# Patient Record
Sex: MALE | Race: WHITE | Employment: UNEMPLOYED | URBAN - METROPOLITAN AREA
[De-identification: names, ages, dates, MRNs, and addresses within clinical notes are randomized per-mention and may not be internally consistent; named-entity substitution may affect disease eponyms.]

---

## 2024-05-13 ENCOUNTER — HOSPITAL ENCOUNTER (INPATIENT)
Age: 64
LOS: 3 days | Discharge: OTHER INSTITUTION | End: 2024-05-16
Attending: EMERGENCY MEDICINE | Admitting: HOSPITALIST
Payer: COMMERCIAL

## 2024-05-13 ENCOUNTER — APPOINTMENT (OUTPATIENT)
Dept: CT IMAGING | Age: 64
End: 2024-05-13
Payer: COMMERCIAL

## 2024-05-13 ENCOUNTER — APPOINTMENT (OUTPATIENT)
Dept: GENERAL RADIOLOGY | Age: 64
End: 2024-05-13
Payer: COMMERCIAL

## 2024-05-13 DIAGNOSIS — R09.02 HYPOXEMIA: ICD-10-CM

## 2024-05-13 DIAGNOSIS — J18.9 PNEUMONIA OF BOTH LUNGS DUE TO INFECTIOUS ORGANISM, UNSPECIFIED PART OF LUNG: Primary | ICD-10-CM

## 2024-05-13 DIAGNOSIS — R40.0 SOMNOLENCE: ICD-10-CM

## 2024-05-13 LAB
ALBUMIN SERPL-MCNC: 3.9 G/DL (ref 3.5–5.2)
ALBUMIN/GLOB SERPL: 1 {RATIO} (ref 1–2.5)
ALP SERPL-CCNC: 101 U/L (ref 40–129)
ALT SERPL-CCNC: 21 U/L (ref 5–41)
ANION GAP SERPL CALCULATED.3IONS-SCNC: 10 MMOL/L (ref 9–17)
AST SERPL-CCNC: 21 U/L
BASOPHILS # BLD: 0 K/UL (ref 0–0.2)
BASOPHILS NFR BLD: 1 % (ref 0–2)
BILIRUB SERPL-MCNC: 0.3 MG/DL (ref 0.3–1.2)
BILIRUB UR QL STRIP: NEGATIVE
BNP SERPL-MCNC: 62 PG/ML
BUN SERPL-MCNC: 17 MG/DL (ref 8–23)
CALCIUM SERPL-MCNC: 9.5 MG/DL (ref 8.6–10.4)
CHLORIDE SERPL-SCNC: 108 MMOL/L (ref 98–107)
CLARITY UR: CLEAR
CO2 SERPL-SCNC: 27 MMOL/L (ref 20–31)
COLOR UR: YELLOW
COMMENT: NORMAL
CREAT SERPL-MCNC: 1.1 MG/DL (ref 0.7–1.2)
EOSINOPHIL # BLD: 0.1 K/UL (ref 0–0.4)
EOSINOPHILS RELATIVE PERCENT: 2 % (ref 1–4)
ERYTHROCYTE [DISTWIDTH] IN BLOOD BY AUTOMATED COUNT: 15.5 % (ref 12.5–15.4)
FIO2: 30
FLUAV AG SPEC QL: NEGATIVE
FLUBV AG SPEC QL: NEGATIVE
GFR, ESTIMATED: 75 ML/MIN/1.73M2
GLUCOSE SERPL-MCNC: 97 MG/DL (ref 70–99)
GLUCOSE UR STRIP-MCNC: NEGATIVE MG/DL
HCO3 VENOUS: 24.1 MMOL/L (ref 22–29)
HCT VFR BLD AUTO: 45.2 % (ref 41–53)
HGB BLD-MCNC: 14.7 G/DL (ref 13.5–17.5)
HGB UR QL STRIP.AUTO: NEGATIVE
KETONES UR STRIP-MCNC: NEGATIVE MG/DL
LACTATE BLDV-SCNC: 1.3 MMOL/L (ref 0.5–1.9)
LEUKOCYTE ESTERASE UR QL STRIP: NEGATIVE
LYMPHOCYTES NFR BLD: 2.6 K/UL (ref 1–4.8)
LYMPHOCYTES RELATIVE PERCENT: 39 % (ref 24–44)
MCH RBC QN AUTO: 27.8 PG (ref 26–34)
MCHC RBC AUTO-ENTMCNC: 32.5 G/DL (ref 31–37)
MCV RBC AUTO: 85.6 FL (ref 80–100)
MONOCYTES NFR BLD: 0.4 K/UL (ref 0.1–1.2)
MONOCYTES NFR BLD: 7 % (ref 2–11)
NEGATIVE BASE EXCESS, VEN: 4.1 MMOL/L (ref 0–2)
NEUTROPHILS NFR BLD: 51 % (ref 36–66)
NEUTS SEG NFR BLD: 3.3 K/UL (ref 1.8–7.7)
NITRITE UR QL STRIP: NEGATIVE
O2 SAT, VEN: 36.6 % (ref 60–85)
PCO2, VEN: 57.3 MM HG (ref 41–51)
PH UR STRIP: 6 [PH] (ref 5–8)
PH VENOUS: 7.23 (ref 7.32–7.43)
PLATELET # BLD AUTO: 157 K/UL (ref 140–450)
PMV BLD AUTO: 7.5 FL (ref 6–12)
PO2, VEN: 26.1 MM HG (ref 30–50)
POC LACTIC ACID: 0.8 MMOL/L (ref 0.56–1.39)
POTASSIUM SERPL-SCNC: 4.2 MMOL/L (ref 3.7–5.3)
PROT SERPL-MCNC: 7.9 G/DL (ref 6.4–8.3)
PROT UR STRIP-MCNC: NEGATIVE MG/DL
RBC # BLD AUTO: 5.28 M/UL (ref 4.5–5.9)
SARS-COV-2 RDRP RESP QL NAA+PROBE: NOT DETECTED
SODIUM SERPL-SCNC: 145 MMOL/L (ref 135–144)
SP GR UR STRIP: 1.02 (ref 1–1.03)
SPECIMEN DESCRIPTION: NORMAL
TROPONIN I SERPL HS-MCNC: 23 NG/L (ref 0–22)
TROPONIN I SERPL HS-MCNC: 28 NG/L (ref 0–22)
UROBILINOGEN UR STRIP-ACNC: NORMAL EU/DL (ref 0–1)
WBC OTHER # BLD: 6.5 K/UL (ref 3.5–11)

## 2024-05-13 PROCEDURE — 71045 X-RAY EXAM CHEST 1 VIEW: CPT

## 2024-05-13 PROCEDURE — 87635 SARS-COV-2 COVID-19 AMP PRB: CPT

## 2024-05-13 PROCEDURE — 1200000000 HC SEMI PRIVATE

## 2024-05-13 PROCEDURE — 83880 ASSAY OF NATRIURETIC PEPTIDE: CPT

## 2024-05-13 PROCEDURE — 87804 INFLUENZA ASSAY W/OPTIC: CPT

## 2024-05-13 PROCEDURE — 99285 EMERGENCY DEPT VISIT HI MDM: CPT

## 2024-05-13 PROCEDURE — 83605 ASSAY OF LACTIC ACID: CPT

## 2024-05-13 PROCEDURE — 70450 CT HEAD/BRAIN W/O DYE: CPT

## 2024-05-13 PROCEDURE — 82803 BLOOD GASES ANY COMBINATION: CPT

## 2024-05-13 PROCEDURE — 2580000003 HC RX 258: Performed by: EMERGENCY MEDICINE

## 2024-05-13 PROCEDURE — 80053 COMPREHEN METABOLIC PANEL: CPT

## 2024-05-13 PROCEDURE — 87040 BLOOD CULTURE FOR BACTERIA: CPT

## 2024-05-13 PROCEDURE — 84484 ASSAY OF TROPONIN QUANT: CPT

## 2024-05-13 PROCEDURE — 81003 URINALYSIS AUTO W/O SCOPE: CPT

## 2024-05-13 PROCEDURE — 36415 COLL VENOUS BLD VENIPUNCTURE: CPT

## 2024-05-13 PROCEDURE — 85025 COMPLETE CBC W/AUTO DIFF WBC: CPT

## 2024-05-13 PROCEDURE — 93005 ELECTROCARDIOGRAM TRACING: CPT | Performed by: EMERGENCY MEDICINE

## 2024-05-13 PROCEDURE — 6360000002 HC RX W HCPCS: Performed by: EMERGENCY MEDICINE

## 2024-05-13 RX ORDER — 0.9 % SODIUM CHLORIDE 0.9 %
1000 INTRAVENOUS SOLUTION INTRAVENOUS ONCE
Status: COMPLETED | OUTPATIENT
Start: 2024-05-13 | End: 2024-05-13

## 2024-05-13 RX ORDER — OLANZAPINE 2.5 MG/1
2.5 TABLET, FILM COATED ORAL 2 TIMES DAILY
COMMUNITY

## 2024-05-13 RX ORDER — FLUOXETINE 10 MG/1
10 TABLET, FILM COATED ORAL EVERY MORNING
COMMUNITY

## 2024-05-13 RX ORDER — KETOCONAZOLE 20 MG/G
CREAM TOPICAL EVERY 6 HOURS PRN
COMMUNITY

## 2024-05-13 RX ORDER — DOCUSATE SODIUM 100 MG/1
100 CAPSULE, LIQUID FILLED ORAL NIGHTLY
COMMUNITY

## 2024-05-13 RX ORDER — TOPIRAMATE 25 MG/1
25 TABLET ORAL 2 TIMES DAILY
COMMUNITY

## 2024-05-13 RX ORDER — LORATADINE 10 MG/1
10 TABLET ORAL DAILY
COMMUNITY

## 2024-05-13 RX ORDER — TAMSULOSIN HYDROCHLORIDE 0.4 MG/1
0.4 CAPSULE ORAL
COMMUNITY

## 2024-05-13 RX ORDER — GABAPENTIN 400 MG/1
400 CAPSULE ORAL 3 TIMES DAILY
COMMUNITY

## 2024-05-13 RX ORDER — ERGOCALCIFEROL 1.25 MG/1
50000 CAPSULE ORAL WEEKLY
COMMUNITY

## 2024-05-13 RX ORDER — ASPIRIN 81 MG/1
81 TABLET ORAL DAILY
COMMUNITY

## 2024-05-13 RX ORDER — DONEPEZIL HYDROCHLORIDE 5 MG/1
5 TABLET, FILM COATED ORAL NIGHTLY
COMMUNITY

## 2024-05-13 RX ORDER — NALOXONE HYDROCHLORIDE 4 MG/.1ML
1 SPRAY NASAL PRN
COMMUNITY

## 2024-05-13 RX ORDER — ATORVASTATIN CALCIUM 20 MG/1
20 TABLET, FILM COATED ORAL NIGHTLY
COMMUNITY

## 2024-05-13 RX ORDER — ACETAMINOPHEN 325 MG/1
650 TABLET ORAL EVERY 6 HOURS PRN
COMMUNITY

## 2024-05-13 RX ORDER — METHOCARBAMOL 500 MG/1
500 TABLET, FILM COATED ORAL EVERY 8 HOURS PRN
COMMUNITY

## 2024-05-13 RX ADMIN — AZITHROMYCIN MONOHYDRATE 500 MG: 500 INJECTION, POWDER, LYOPHILIZED, FOR SOLUTION INTRAVENOUS at 22:57

## 2024-05-13 RX ADMIN — CEFTRIAXONE SODIUM 1000 MG: 1 INJECTION, POWDER, FOR SOLUTION INTRAMUSCULAR; INTRAVENOUS at 22:49

## 2024-05-13 RX ADMIN — SODIUM CHLORIDE 1000 ML: 9 INJECTION, SOLUTION INTRAVENOUS at 21:30

## 2024-05-13 ASSESSMENT — PAIN - FUNCTIONAL ASSESSMENT: PAIN_FUNCTIONAL_ASSESSMENT: NONE - DENIES PAIN

## 2024-05-14 ENCOUNTER — APPOINTMENT (OUTPATIENT)
Dept: GENERAL RADIOLOGY | Age: 64
End: 2024-05-14
Payer: COMMERCIAL

## 2024-05-14 PROBLEM — R62.50 DEVELOPMENTAL DELAY: Status: ACTIVE | Noted: 2024-05-14

## 2024-05-14 PROBLEM — J96.01 ACUTE HYPOXIC RESPIRATORY FAILURE (HCC): Status: ACTIVE | Noted: 2024-05-14

## 2024-05-14 PROBLEM — Z98.890 H/O CERVICAL SPINE SURGERY: Status: ACTIVE | Noted: 2024-05-14

## 2024-05-14 LAB
ANION GAP SERPL CALCULATED.3IONS-SCNC: 7 MMOL/L (ref 9–17)
BASOPHILS # BLD: 0 K/UL (ref 0–0.2)
BASOPHILS NFR BLD: 1 % (ref 0–2)
BUN SERPL-MCNC: 16 MG/DL (ref 8–23)
CALCIUM SERPL-MCNC: 8.7 MG/DL (ref 8.6–10.4)
CHLORIDE SERPL-SCNC: 114 MMOL/L (ref 98–107)
CO2 SERPL-SCNC: 25 MMOL/L (ref 20–31)
CREAT SERPL-MCNC: 0.9 MG/DL (ref 0.7–1.2)
EOSINOPHIL # BLD: 0.2 K/UL (ref 0–0.4)
EOSINOPHILS RELATIVE PERCENT: 3 % (ref 1–4)
ERYTHROCYTE [DISTWIDTH] IN BLOOD BY AUTOMATED COUNT: 15.2 % (ref 12.5–15.4)
GFR, ESTIMATED: >90 ML/MIN/1.73M2
GLUCOSE SERPL-MCNC: 89 MG/DL (ref 70–99)
HCT VFR BLD AUTO: 42.2 % (ref 41–53)
HGB BLD-MCNC: 13.8 G/DL (ref 13.5–17.5)
INR PPP: 1.1
LACTATE BLDV-SCNC: 0.9 MMOL/L (ref 0.5–2.2)
LYMPHOCYTES NFR BLD: 2.2 K/UL (ref 1–4.8)
LYMPHOCYTES RELATIVE PERCENT: 35 % (ref 24–44)
MCH RBC QN AUTO: 27.9 PG (ref 26–34)
MCHC RBC AUTO-ENTMCNC: 32.6 G/DL (ref 31–37)
MCV RBC AUTO: 85.4 FL (ref 80–100)
MONOCYTES NFR BLD: 0.4 K/UL (ref 0.1–1.2)
MONOCYTES NFR BLD: 7 % (ref 2–11)
NEUTROPHILS NFR BLD: 54 % (ref 36–66)
NEUTS SEG NFR BLD: 3.4 K/UL (ref 1.8–7.7)
PLATELET # BLD AUTO: 126 K/UL (ref 140–450)
PMV BLD AUTO: 7.4 FL (ref 6–12)
POTASSIUM SERPL-SCNC: 3.8 MMOL/L (ref 3.7–5.3)
PROTHROMBIN TIME: 11.3 SEC (ref 9.4–12.6)
RBC # BLD AUTO: 4.95 M/UL (ref 4.5–5.9)
SODIUM SERPL-SCNC: 146 MMOL/L (ref 135–144)
WBC OTHER # BLD: 6.3 K/UL (ref 3.5–11)

## 2024-05-14 PROCEDURE — 80048 BASIC METABOLIC PNL TOTAL CA: CPT

## 2024-05-14 PROCEDURE — 94640 AIRWAY INHALATION TREATMENT: CPT

## 2024-05-14 PROCEDURE — 99222 1ST HOSP IP/OBS MODERATE 55: CPT | Performed by: STUDENT IN AN ORGANIZED HEALTH CARE EDUCATION/TRAINING PROGRAM

## 2024-05-14 PROCEDURE — 97116 GAIT TRAINING THERAPY: CPT

## 2024-05-14 PROCEDURE — 97166 OT EVAL MOD COMPLEX 45 MIN: CPT

## 2024-05-14 PROCEDURE — 6370000000 HC RX 637 (ALT 250 FOR IP): Performed by: NURSE PRACTITIONER

## 2024-05-14 PROCEDURE — 1200000000 HC SEMI PRIVATE

## 2024-05-14 PROCEDURE — 94760 N-INVAS EAR/PLS OXIMETRY 1: CPT

## 2024-05-14 PROCEDURE — 2580000003 HC RX 258: Performed by: NURSE PRACTITIONER

## 2024-05-14 PROCEDURE — 94761 N-INVAS EAR/PLS OXIMETRY MLT: CPT

## 2024-05-14 PROCEDURE — 36415 COLL VENOUS BLD VENIPUNCTURE: CPT

## 2024-05-14 PROCEDURE — 2700000000 HC OXYGEN THERAPY PER DAY

## 2024-05-14 PROCEDURE — 71045 X-RAY EXAM CHEST 1 VIEW: CPT

## 2024-05-14 PROCEDURE — 6360000002 HC RX W HCPCS: Performed by: NURSE PRACTITIONER

## 2024-05-14 PROCEDURE — 85025 COMPLETE CBC W/AUTO DIFF WBC: CPT

## 2024-05-14 PROCEDURE — 97530 THERAPEUTIC ACTIVITIES: CPT

## 2024-05-14 PROCEDURE — 85610 PROTHROMBIN TIME: CPT

## 2024-05-14 PROCEDURE — 97162 PT EVAL MOD COMPLEX 30 MIN: CPT

## 2024-05-14 PROCEDURE — 83605 ASSAY OF LACTIC ACID: CPT

## 2024-05-14 RX ORDER — SODIUM CHLORIDE 9 MG/ML
INJECTION, SOLUTION INTRAVENOUS PRN
Status: DISCONTINUED | OUTPATIENT
Start: 2024-05-14 | End: 2024-05-16 | Stop reason: HOSPADM

## 2024-05-14 RX ORDER — SODIUM CHLORIDE 0.9 % (FLUSH) 0.9 %
5-40 SYRINGE (ML) INJECTION EVERY 12 HOURS SCHEDULED
Status: DISCONTINUED | OUTPATIENT
Start: 2024-05-14 | End: 2024-05-16 | Stop reason: HOSPADM

## 2024-05-14 RX ORDER — SODIUM CHLORIDE 9 MG/ML
INJECTION, SOLUTION INTRAVENOUS CONTINUOUS
Status: DISCONTINUED | OUTPATIENT
Start: 2024-05-14 | End: 2024-05-16

## 2024-05-14 RX ORDER — ACETAMINOPHEN 650 MG/1
650 SUPPOSITORY RECTAL EVERY 6 HOURS PRN
Status: DISCONTINUED | OUTPATIENT
Start: 2024-05-14 | End: 2024-05-16 | Stop reason: HOSPADM

## 2024-05-14 RX ORDER — ACETAMINOPHEN 325 MG/1
650 TABLET ORAL EVERY 6 HOURS PRN
Status: DISCONTINUED | OUTPATIENT
Start: 2024-05-14 | End: 2024-05-16 | Stop reason: HOSPADM

## 2024-05-14 RX ORDER — ENOXAPARIN SODIUM 100 MG/ML
40 INJECTION SUBCUTANEOUS DAILY
Status: DISCONTINUED | OUTPATIENT
Start: 2024-05-14 | End: 2024-05-16 | Stop reason: HOSPADM

## 2024-05-14 RX ORDER — IPRATROPIUM BROMIDE AND ALBUTEROL SULFATE 2.5; .5 MG/3ML; MG/3ML
1 SOLUTION RESPIRATORY (INHALATION)
Status: DISCONTINUED | OUTPATIENT
Start: 2024-05-14 | End: 2024-05-14

## 2024-05-14 RX ORDER — ALBUTEROL SULFATE 2.5 MG/3ML
2.5 SOLUTION RESPIRATORY (INHALATION)
Status: DISCONTINUED | OUTPATIENT
Start: 2024-05-14 | End: 2024-05-16 | Stop reason: HOSPADM

## 2024-05-14 RX ORDER — ONDANSETRON 4 MG/1
4 TABLET, ORALLY DISINTEGRATING ORAL EVERY 8 HOURS PRN
Status: DISCONTINUED | OUTPATIENT
Start: 2024-05-14 | End: 2024-05-16 | Stop reason: HOSPADM

## 2024-05-14 RX ORDER — ONDANSETRON 2 MG/ML
4 INJECTION INTRAMUSCULAR; INTRAVENOUS EVERY 6 HOURS PRN
Status: DISCONTINUED | OUTPATIENT
Start: 2024-05-14 | End: 2024-05-16 | Stop reason: HOSPADM

## 2024-05-14 RX ORDER — SODIUM CHLORIDE 0.9 % (FLUSH) 0.9 %
10 SYRINGE (ML) INJECTION PRN
Status: DISCONTINUED | OUTPATIENT
Start: 2024-05-14 | End: 2024-05-16 | Stop reason: HOSPADM

## 2024-05-14 RX ORDER — IPRATROPIUM BROMIDE AND ALBUTEROL SULFATE 2.5; .5 MG/3ML; MG/3ML
1 SOLUTION RESPIRATORY (INHALATION)
Status: DISCONTINUED | OUTPATIENT
Start: 2024-05-14 | End: 2024-05-16 | Stop reason: HOSPADM

## 2024-05-14 RX ADMIN — CEFTRIAXONE SODIUM 1000 MG: 1 INJECTION, POWDER, FOR SOLUTION INTRAMUSCULAR; INTRAVENOUS at 22:25

## 2024-05-14 RX ADMIN — IPRATROPIUM BROMIDE AND ALBUTEROL SULFATE 1 DOSE: 2.5; .5 SOLUTION RESPIRATORY (INHALATION) at 20:14

## 2024-05-14 RX ADMIN — IPRATROPIUM BROMIDE AND ALBUTEROL SULFATE 1 DOSE: 2.5; .5 SOLUTION RESPIRATORY (INHALATION) at 13:19

## 2024-05-14 RX ADMIN — IPRATROPIUM BROMIDE AND ALBUTEROL SULFATE 1 DOSE: 2.5; .5 SOLUTION RESPIRATORY (INHALATION) at 09:00

## 2024-05-14 RX ADMIN — AZITHROMYCIN MONOHYDRATE 500 MG: 500 INJECTION, POWDER, LYOPHILIZED, FOR SOLUTION INTRAVENOUS at 22:58

## 2024-05-14 RX ADMIN — SODIUM CHLORIDE: 9 INJECTION, SOLUTION INTRAVENOUS at 14:09

## 2024-05-14 RX ADMIN — ENOXAPARIN SODIUM 40 MG: 100 INJECTION SUBCUTANEOUS at 08:45

## 2024-05-14 RX ADMIN — SODIUM CHLORIDE: 9 INJECTION, SOLUTION INTRAVENOUS at 00:36

## 2024-05-14 NOTE — PROGRESS NOTES
Occupational Therapy  Facility/Department: [address]  Occupational Therapy Initial Assessment    Name: Matthew Alex  : 1960  MRN: [id number]  Date of Service: 2024    Chief Complaint   Patient presents with    Bradycardia    Respiratory Distress     Discharge Recommendations:  Patient would benefit from continued therapy after discharge  OT Equipment Recommendations  Other: AE/DME recommnedations TBD     Patient Diagnosis(es): The primary encounter diagnosis was Pneumonia of both lungs due to infectious organism, unspecified part of lung. Diagnoses of Hypoxemia and Somnolence were also pertinent to this visit.  Past Medical History:  has a past medical history of BPH with obstruction/lower urinary tract symptoms, Depression, Dysphagia, oropharyngeal phase, GERD without esophagitis, Hyperlipidemia, Iron deficiency anemia secondary to blood loss (chronic), Moderate intellectual disabilities, Nonsuicidal self-harm (HCC), Obstructive and reflux uropathy, unspecified, Other spondylosis with myelopathy, cervical region, Pervasive developmental disorder, Post-traumatic urethral stricture, male, meatal, Schizoaffective disorder, depressive type (HCC), Vascular dementia with psychotic disturbance (HCC), and Vitamin D deficiency.  Past Surgical History:  has no past surgical history on file.    Assessment   Performance deficits / Impairments: Decreased functional mobility ;Decreased fine motor control;Decreased strength;Decreased ROM;Decreased endurance;Decreased posture;Decreased cognition;Decreased ADL status;Decreased balance;Decreased coordination;Decreased safe awareness    Assessment: Pt seen for therapy eval s/p admission with pneumonia. Pt arrives from ECF; unsure of PLOF however pt likely ranges from MOD-DEP for all ADLs and assist x2 for all ADL transfers. Pt presents with decreased ADL status secondary to above noted deficits. At this time, pt is MAX A bed mobility; MIN A x2 sit<>stand with RW,

## 2024-05-14 NOTE — PROGRESS NOTES
SPEECH LANGUAGE PATHOLOGY  Speech Language Pathology  [address]    Date: 5/14/2024  Patient Name: Matthew Alex  YOB: 1960   AGE: 63 y.o.  MRN: [id number]        Patient Not Available for Speech Therapy     Due to:  [] Testing  [] Hemodialysis  [] Cancelled by RN  [] Surgery   [] Intubation/Sedation/Pain Medication  [] Medical instability  [] Refusal  [x] Other: Order for swallow eval received after noon (@1415); ST unable to accommodate in schedule. ST to complete evaluation tomorrow. SARINA Bean notified.     Next scheduled treatment: 05/15/2024  Completed by: Carlito Torre M.S., CCC-SLP

## 2024-05-14 NOTE — ED PROVIDER NOTES
[facility]  eMERGENCY dEPARTMENT eNCOUnter      Pt Name: Matthew Alex  MRN: [id number]  Birthdate 1960  Date of evaluation: 5/13/2024      CHIEF COMPLAINT       Chief Complaint   Patient presents with    Bradycardia    Respiratory Distress         HISTORY OF PRESENT ILLNESS    Matthew Alex is a 63 y.o. male who presents with altered mental status increasing shortness of breath patient is in a nursing facility he is profoundly mentally delayed also has a history of schizoaffective disorder his mother who is present said he had a previous stroke and had to have neck surgery because of the stroke origin in his neck.  Normally he talks he has been talking his blood sugar was 90 and he was requiring oxygen in route squad gave Narcan without response on arrival here he is breathing easy his sats are in the 90s but he is nonverbal, that is new      REVIEW OF SYSTEMS         Review of Systems   Unable to perform ROS: Patient nonverbal         PAST MEDICAL HISTORY    has no past medical history on file.    SURGICAL HISTORY      has no past surgical history on file.    CURRENT MEDICATIONS       Previous Medications    No medications on file       ALLERGIES     has no allergies on file.    FAMILY HISTORY     has no family status information on file.      family history is not on file.    SOCIAL HISTORY          PHYSICAL EXAM     INITIAL VITALS:  blood pressure is 112/69 and his pulse is 57. His respiration is 8 (abnormal) and oxygen saturation is 90%.        Physical Exam  Constitutional:       Appearance: He is well-developed.      Comments: Patient is somnolent with minimal movement maintaining his airway respirations are easy lungs are generally clear   HENT:      Head: Normocephalic and atraumatic.      Right Ear: External ear normal.      Left Ear: External ear normal.   Eyes:      Pupils: Pupils are equal, round, and reactive to light.   Cardiovascular:      Rate and Rhythm:

## 2024-05-14 NOTE — CARE COORDINATION
Case Management Assessment  Initial Evaluation    Date/Time of Evaluation: 5/14/2024 3:49 PM  Assessment Completed by: Adriana Yen RN    If patient is discharged prior to next notation, then this note serves as note for discharge by case management.    Patient Name: Matthew Alex                   YOB: 1960  Diagnosis: Hypoxemia [R09.02]  Somnolence [R40.0]  Pneumonia of both lungs due to infectious organism, unspecified part of lung [J18.9]  Community acquired pneumonia, bilateral [J18.9]                   Date / Time: 5/13/2024  8:03 PM    Patient Admission Status: Inpatient   Readmission Risk (Low < 19, Mod (19-27), High > 27): Readmission Risk Score: 11.2    Current PCP: Philip Wei MD  PCP verified by CM? No    Chart Reviewed: Yes      History Provided by:    Patient Orientation: Alert and Oriented, Person, Self    Patient Cognition: Other (see comment) (Developmentally Delay 10year capacity)    Hospitalization in the last 30 days (Readmission):  No    If yes, Readmission Assessment in  Navigator will be completed.    Advance Directives:      Code Status: Full Code   Patient's Primary Decision Maker is: Legal Next of Kin    Primary Decision Maker: Kaylyn Sylvester [facility] - [phone]    Discharge Planning:    Patient lives with: Alone Type of Home: Long-Term Care  Primary Care Giver:  ([facility]  Patient Support Systems include: Parent, Family Members   Current Financial resources: Medicare, Medicaid  Current community resources: ECF/Home Care  Current services prior to admission: Extended Care Facility            Current DME:              Type of Home Care services:  None    ADLS  Prior functional level: Assistance with the following:, Bathing, Dressing, Toileting, Cooking, Housework, Shopping, Mobility  Current functional level: Assistance with the following:, Bathing, Dressing, Toileting, Cooking, Housework, Shopping, Mobility    PT AM-PAC:

## 2024-05-14 NOTE — PROGRESS NOTES
SPIRITUAL CARE DEPARTMENT - [facility]  PROGRESS NOTE    Room # 307/307-01   Name: Matthew Alex            Rastafari: Unknown     Reason for visit:  Palliative Care    I visited the patient.    Admit Date & Time: 5/13/2024  8:03 PM    Assessment:  Matthew Alex is a 63 y.o. male in the hospital because of community acquired pneumonia. Upon entering the room Writer observed Patient was sitting in the bedside chair. Patient acknowledged writer's greeting. Pt responded in brief answers, mainly yes and no. Pt was receptive to conversation. Pt did not show much expression.    Patient shared that his Mom and Brother are his support system and also mentioned his daughter, where she works and lives. Pt named one of his interests as watching TV and music. Pt affirmed that he does pray and that it helps, \"sometimes.\" Pt spoke of his favorite food and that his mom is a good cook. Pt spoke of the weather today.    Intervention:  I introduced myself and my title as .   Writer inquired how Pt was doing. Writer asked questions to learn about Pt's coping, interests, and sources of support and strength. Writer offered words of support and encouragement.      Outcome:  Patient thanked writer for the visit.     Plan:  Chaplains will remain available to offer spiritual and emotional support as needed.     05/14/24 1705   Encounter Summary   Encounter Overview/Reason Palliative Care   Service Provided For Patient   Referral/Consult From Palliative Care   Support System Family members;Parent   Last Encounter  05/14/24   Complexity of Encounter Low   Begin Time 1655   End Time  1705   Total Time Calculated 10 min   Spiritual/Emotional needs   Type Spiritual Support   Assessment/Intervention/Outcome   Assessment Calm;Coping   Intervention Active listening;Explored/Affirmed feelings, thoughts, concerns;Explored Coping Skills/Resources;Sustaining Presence/Ministry of presence   Outcome Coping;Expressed Gratitude   Plan

## 2024-05-14 NOTE — PROGRESS NOTES
Physical Therapy  Facility/Department: [address]  Physical Therapy Initial Assessment    Name: Matthew Alex  : 1960  MRN: [id number]  Date of Service: 2024  Chief Complaint   Patient presents with    Bradycardia    Respiratory Distress         Discharge Recommendations:  Patient would benefit from continued therapy after discharge, Continue to assess pending progress   PT Equipment Recommendations  Equipment Needed: No      Patient Diagnosis(es): The primary encounter diagnosis was Pneumonia of both lungs due to infectious organism, unspecified part of lung. Diagnoses of Hypoxemia and Somnolence were also pertinent to this visit.  Past Medical History:  has a past medical history of BPH with obstruction/lower urinary tract symptoms, Depression, Dysphagia, oropharyngeal phase, GERD without esophagitis, Hyperlipidemia, Iron deficiency anemia secondary to blood loss (chronic), Moderate intellectual disabilities, Nonsuicidal self-harm (HCC), Obstructive and reflux uropathy, unspecified, Other spondylosis with myelopathy, cervical region, Pervasive developmental disorder, Post-traumatic urethral stricture, male, meatal, Schizoaffective disorder, depressive type (HCC), Vascular dementia with psychotic disturbance (HCC), and Vitamin D deficiency.  Past Surgical History:  has no past surgical history on file.    Assessment   Body Structures, Functions, Activity Limitations Requiring Skilled Therapeutic Intervention: Decreased functional mobility ;Decreased strength;Decreased posture;Decreased balance;Decreased safe awareness;Decreased endurance  Assessment: [facility] [place] and charting indicates pt is non ambulatory and w/c bound for 1.5 years and dependent for mobility. At PT evaluation, pt required max A out of hospital bed. He was able to transfer with RW with minAx2 and Mckayla Stedy with CGAx2. Trial sidestep to HOB 2ft with RW mod Ax2 with max cues verbal/tactile as instability knees

## 2024-05-14 NOTE — PLAN OF CARE
Problem: Respiratory - Adult  Goal: Achieves optimal ventilation and oxygenation  Outcome: Progressing  Flowsheets (Taken 5/14/2024 0906)  Achieves optimal ventilation and oxygenation:   Position to facilitate oxygenation and minimize respiratory effort   Assess for changes in respiratory status   Assess the need for suctioning and aspirate as needed   Respiratory therapy support as indicated   Assess and instruct to report shortness of breath or any respiratory difficulty   Encourage broncho-pulmonary hygiene including cough, deep breathe, incentive spirometry   Oxygen supplementation based on oxygen saturation or arterial blood gases   Assess for changes in mentation and behavior

## 2024-05-14 NOTE — PLAN OF CARE
Problem: Discharge Planning  Goal: Discharge to home or other facility with appropriate resources  Outcome: Progressing  Flowsheets (Taken 5/14/2024 0028)  Discharge to home or other facility with appropriate resources: Identify barriers to discharge with patient and caregiver     Problem: Skin/Tissue Integrity  Goal: Absence of new skin breakdown  Description: 1.  Monitor for areas of redness and/or skin breakdown  2.  Assess vascular access sites hourly  3.  Every 4-6 hours minimum:  Change oxygen saturation probe site  4.  Every 4-6 hours:  If on nasal continuous positive airway pressure, respiratory therapy assess nares and determine need for appliance change or resting period.  Outcome: Progressing     Problem: Safety - Adult  Goal: Free from fall injury  Outcome: Progressing   NO FALLS OR INJURIES THIS SHIFT, BED IN LOWEST POSITION, BRAKES ON, BED ALARM ON, CALL LIGHT IN REACH, SIDE RAILS UP X2.

## 2024-05-14 NOTE — H&P
Ref Range    Specimen Description .NASOPHARYNGEAL SWAB     SARS-CoV-2, Rapid Not Detected Not Detected   Rapid influenza A/B antigens    Collection Time: 05/13/24  8:31 PM    Specimen: Nasopharyngeal   Result Value Ref Range    Flu A Antigen NEGATIVE NEGATIVE    Flu B Antigen NEGATIVE NEGATIVE   Culture, Blood 1    Collection Time: 05/13/24  8:40 PM    Specimen: Blood   Result Value Ref Range    Specimen Description .BLOOD     Special Requests 5Ml right hand     Culture NO GROWTH 12 HOURS    Urinalysis with Reflex to Culture    Collection Time: 05/13/24  8:42 PM    Specimen: Urine   Result Value Ref Range    Color, UA Yellow Yellow    Turbidity UA Clear Clear    Glucose, Ur NEGATIVE NEGATIVE mg/dL    Bilirubin, Urine NEGATIVE NEGATIVE    Ketones, Urine NEGATIVE NEGATIVE mg/dL    Specific Gravity, UA 1.025 1.005 - 1.030    Urine Hgb NEGATIVE NEGATIVE    pH, Urine 6.0 5.0 - 8.0    Protein, UA NEGATIVE NEGATIVE mg/dL    Urobilinogen, Urine Normal 0.0 - 1.0 EU/dL    Nitrite, Urine NEGATIVE NEGATIVE    Leukocyte Esterase, Urine NEGATIVE NEGATIVE    Comment       Microscopic exam not performed based on chemical results unless requested in original order.    Comment          Comment       Utilizing a urinalysis as the only screening method to exclude a potential uropathogen can be unreliable in many patient populations.  Rapid screening tests are less sensitive than culture and if UTI is a clinical possibility, culture should be considered despite a negative urinalysis.     Troponin    Collection Time: 05/13/24 10:35 PM   Result Value Ref Range    Troponin, High Sensitivity 23 (H) 0 - 22 ng/L   Brain Natriuretic Peptide    Collection Time: 05/13/24 10:35 PM   Result Value Ref Range    Pro-BNP 62 <300 pg/mL   Venous Blood Gas, POC    Collection Time: 05/13/24 11:02 PM   Result Value Ref Range    pH, Nam 7.233 (L) 7.320 - 7.430    pCO2, Nam 57.3 (H) 41.0 - 51.0 mm Hg    pO2, Nam 26.1 (L) 30.0 - 50.0 mm Hg    HCO3, Venous        Imaging/Diagnostics:  XR CHEST PORTABLE    Result Date: 5/14/2024  Subtle infiltrate in the right upper lobe. Mild perihilar congestion, but no active pleural disease.     CT HEAD WO CONTRAST    Result Date: 5/13/2024  No acute intracranial abnormality.     XR CHEST PORTABLE    Result Date: 5/13/2024  Pulmonary venous congestion with perihilar pulmonary consolidation. Findings could be due to pulmonary edema or multifocal pneumonia.       Assessment :      Hospital Problems             Last Modified POA    * (Principal) Community acquired pneumonia, bilateral 5/13/2024 Yes    Acute hypoxic respiratory failure (HCC) 5/14/2024 Yes    Developmental delay 5/14/2024 Yes    H/O cervical spine surgery 5/14/2024 Yes       Plan:     Acute hypoxic respiratory failure secondary to pneumonia  Concern for aspiration?  Developmental delay  S/p cervical fusion surgery    -Continue IV antibiotics, wean off oxygen as tolerated, follow-up with repeat chest x-ray and Pro-Timmy levels  -Swallow evaluation, currently n.p.o. on IV fluids  -Resume home medications as tolerated  -Palliative care consult  -Discharge planning in progress    Consultations:   IP CONSULT TO PALLIATIVE CARE    Patient is admitted as inpatient status because of co-morbidities listed above, severity of signs and symptoms as outlined, requirement for current medical therapies and most importantly because of direct risk to patient if care not provided in a hospital setting.  Expected length of stay > 48 hours.    Ata Kevin Sra, MD  5/14/2024  7:07 PM    Copy sent to Dr. Wei, Philip TREVINO MD

## 2024-05-14 NOTE — RT PROTOCOL NOTE
RT Nebulizer Bronchodilator Protocol Note    There is a bronchodilator order in the chart from a provider indicating to follow the RT Bronchodilator Protocol and there is an “Initiate RT Bronchodilator Protocol” order as well (see protocol at bottom of note).    CXR Findings:  XR CHEST PORTABLE    Result Date: 5/13/2024  Pulmonary venous congestion with perihilar pulmonary consolidation. Findings could be due to pulmonary edema or multifocal pneumonia.       The findings from the last RT Protocol Assessment were as follows:  Smoking: None or smoker <15 pack years  Respiratory Pattern: Mild dyspnea at rest, irregular pattern, or RR 21-25 bpm  Breath Sounds: Slightly diminished and/or crackles  Cough: Weak, non-productive  Indication for Bronchodilator Therapy:    Bronchodilator Assessment Score: 9    Aerosolized bronchodilator medication orders have been revised according to the RT Nebulizer Bronchodilator Protocol below.    Respiratory Therapist to perform RT Therapy Protocol Assessment initially then follow the protocol.  Repeat RT Therapy Protocol Assessment PRN for score 0-3 or on second treatment, BID, and PRN for scores above 3.    No Indications - adjust the frequency to every 6 hours PRN wheezing or bronchospasm, if no treatments needed after 48 hours then discontinue using Per Protocol order mode.     If indication present, adjust the RT bronchodilator orders based on the Bronchodilator Assessment Score as indicated below.  If a patient is on this medication at home then do not decrease Frequency below that used at home.    0-3 - enter or revise RT bronchodilator order(s) to equivalent RT Bronchodilator order with Frequency of every 4 hours PRN for wheezing or increased work of breathing using Per Protocol order mode.       4-6 - enter or revise RT Bronchodilator order(s) to two equivalent RT bronchodilator orders with one order with BID Frequency and one order with Frequency of every 4 hours PRN wheezing or

## 2024-05-14 NOTE — PLAN OF CARE
Problem: Discharge Planning  Goal: Discharge to home or other facility with appropriate resources  5/14/2024 1341 by Geneva Bryan RN  Outcome: Progressing  Flowsheets (Taken 5/14/2024 0844)  Discharge to home or other facility with appropriate resources: Identify barriers to discharge with patient and caregiver  5/14/2024 0231 by Aide Gilmore RN  Outcome: Progressing  Flowsheets (Taken 5/14/2024 0028)  Discharge to home or other facility with appropriate resources: Identify barriers to discharge with patient and caregiver     Problem: Skin/Tissue Integrity  Goal: Absence of new skin breakdown  Description: 1.  Monitor for areas of redness and/or skin breakdown  2.  Assess vascular access sites hourly  3.  Every 4-6 hours minimum:  Change oxygen saturation probe site  4.  Every 4-6 hours:  If on nasal continuous positive airway pressure, respiratory therapy assess nares and determine need for appliance change or resting period.  5/14/2024 1341 by Geneva Bryan RN  Outcome: Progressing  5/14/2024 0231 by Aide Gilmore RN  Outcome: Progressing     Problem: Safety - Adult  Goal: Free from fall injury  5/14/2024 1341 by Geneva Bryan RN  Outcome: Progressing  5/14/2024 0231 by Aide Gilmore RN  Outcome: Progressing     Problem: Respiratory - Adult  Goal: Achieves optimal ventilation and oxygenation  5/14/2024 1341 by Geneva Bryan RN  Outcome: Progressing  5/14/2024 0906 by Gladys Andrade RCP  Outcome: Progressing  Flowsheets  Taken 5/14/2024 0906 by Gladys Andrade RCP  Achieves optimal ventilation and oxygenation:   Position to facilitate oxygenation and minimize respiratory effort   Assess for changes in respiratory status   Assess the need for suctioning and aspirate as needed   Respiratory therapy support as indicated   Assess and instruct to report shortness of breath or any respiratory difficulty   Encourage broncho-pulmonary hygiene including cough, deep breathe,  care  3. Help patient and family identify pros/cons of alternative solutions  4. Provide information as requested by patient/family  5. Respect patient/family right to receive or not to receive information  6. Serve as a liaison between patient and family and health care team  7. Initiate Consults from Ethics, Palliative Care or initiate Family Care Conference as is appropriate  Outcome: Progressing  Flowsheets (Taken 5/14/2024 0844)  Patient/family able to effectively weigh alternatives and participate in decision making related to treatment and care: Determine when there are differences between patient's view, family's view, and healthcare provider's view of condition     Problem: Spiritual Care  Goal: Pt/Family able to move forward in process of forgiving self, others, and/or higher power  Description: INTERVENTIONS:  1. Assist patient/family to overcome blocks to healing by use of spiritual practices (prayer, meditation, guided imagery, reiki, breath work, etc).  2. De-myth guilt and help patient/family identify possible irrational spiritual/cultural beliefs and values.  3. Explore possibilities of making amends & reconciliation with self, others, and/or a greater power.  4. Guide patient/family in identifying painful feelings of guilt.  5. Help patient/famiy explore and identify spiritual beliefs, cultural understandings or values that may help or hinder letting go of issue.  6. Help patient/family explore feelings of anger, bitterness, resentment.  7. Help patient/family identify and examine the situation in which these feelings are experienced.  8. Help patient/family identify destructive displacement of feelings onto other individuals.  9. Invite use of sacraments/rituals/ceremonies as appropriate (e.g. - confession, anointing, smudging).  10. Refer patient/family to formal counseling and/or to joy community for further support work.  Outcome: Progressing  Flowsheets (Taken 5/14/2024 0844)  Patient/family

## 2024-05-14 NOTE — CONSULTS
..PALLIATIVE CARE NURSING ASSESSMENT    Patient: Matthew Alex  Room: 307/307-01    Reason For Consult   Goals of care evaluation  Distress management  Guidance and support  Facilitate communications  Assistance in coordinating care    Code Status: Full Code    PLAN:  -Pt admitted for PNA. Possible aspiration. Pt A+O x2. Pt has cognitive impairment and baseline function is of a 10 year old.   -Reached out to patient's Mother and had a long discussion with her. She and her son (pt's brother) have been making medical decisions for the patient. They are pursuing a guardian for the patient (in process?) Pt is  but his wife is developmentally delayed (age of a 7year old). Pt does have a child which was legally adopted and raised by patient's mother.   -Discussed the possibility of a peg tube in the future. Mother is undecided if she would want one for the patient.  -Discussed code status with Mother. She will think about it. Our team will follow up on this tomorrow. Mother seems open to discuss further  -Support offered to Mother on the phone.         Impression: Matthew Alex is a 63 y.o. year old male  has a past medical history of BPH with obstruction/lower urinary tract symptoms, Depression, Dysphagia, oropharyngeal phase, GERD without esophagitis, Hyperlipidemia, Iron deficiency anemia secondary to blood loss (chronic), Moderate intellectual disabilities, Nonsuicidal self-harm (HCC), Obstructive and reflux uropathy, unspecified, Other spondylosis with myelopathy, cervical region, Pervasive developmental disorder, Post-traumatic urethral stricture, male, meatal, Schizoaffective disorder, depressive type (HCC), Vascular dementia with psychotic disturbance (HCC), and Vitamin D deficiency..  Currently hospitalized for the management of pneumonia.  The Palliative Care Team is following to assist with goals of care/support.     Vital Signs  Blood pressure 97/75, pulse 52, temperature 97.7 °F (36.5 °C),  Pt's wife moved in with her sister in  [place], [place] and has not seen the patient since his stroke (2 years ago). Mother states they occasionally talk for a few minutes on the phone on a Sunday but wife is much more cognitively delayed than the patient and would not be able to make medical decisions for the patient. Mother states her and her younger son have been making medical decisions for the patient. She states they are pursing a guardian for the patient through the facility but is taking longer than expected. I updated hospital  Maddi and she will check on it to see the progress of this.     We did discuss options for care which included a code status discussion. I explained that the patient was a full code currently and what that means. Kaylyn states, \"I wish I would have changed that years ago\". I explained that this can be changed at any time with an order from a Doctor or NP. We reviewed the options and she will think about it. I will have one of our providers follow up with her tomorrow. I told her someone from my team will be calling her tomorrow to discuss further, she's agreeable. Kaylyn will be coming in tomorrow around 1:30pm    We discussed the plan moving forward. I explained that patient could possibly be aspirating and the care team may want to evaluate swallowing. Mother states this has been done in the past and patient is currently on pureed diet, which he hates. I explained that if patient's swallowing has become worse, it is possible he may need a feeding tube. Mother did not have input into this at this time. She understands this could be possible.     Updated MICHAELA CORCORAN, RN and Dr. Moran.     We will follow up tomorrow to discuss these things further.              Palliative Care Coordinator  Meryl HARO, RN, PN    [place] Office: [phone]   [place] Office: [phone]    For [facility] Clinic scheduling please call [phone]

## 2024-05-15 ENCOUNTER — APPOINTMENT (OUTPATIENT)
Dept: GENERAL RADIOLOGY | Age: 64
End: 2024-05-15
Payer: COMMERCIAL

## 2024-05-15 PROBLEM — Z71.89 GOALS OF CARE, COUNSELING/DISCUSSION: Status: ACTIVE | Noted: 2024-05-15

## 2024-05-15 PROBLEM — J18.9 PNEUMONIA OF BOTH LUNGS DUE TO INFECTIOUS ORGANISM: Status: ACTIVE | Noted: 2024-05-15

## 2024-05-15 PROBLEM — Z51.5 ENCOUNTER FOR PALLIATIVE CARE: Status: ACTIVE | Noted: 2024-05-15

## 2024-05-15 LAB
ANION GAP SERPL CALCULATED.3IONS-SCNC: 9 MMOL/L (ref 9–17)
BASOPHILS # BLD: 0 K/UL (ref 0–0.2)
BASOPHILS NFR BLD: 1 % (ref 0–2)
BUN SERPL-MCNC: 13 MG/DL (ref 8–23)
CALCIUM SERPL-MCNC: 8.7 MG/DL (ref 8.6–10.4)
CHLORIDE SERPL-SCNC: 112 MMOL/L (ref 98–107)
CO2 SERPL-SCNC: 25 MMOL/L (ref 20–31)
CREAT SERPL-MCNC: 0.9 MG/DL (ref 0.7–1.2)
EKG ATRIAL RATE: 48 BPM
EKG ATRIAL RATE: 59 BPM
EKG P AXIS: 40 DEGREES
EKG P AXIS: 69 DEGREES
EKG P-R INTERVAL: 156 MS
EKG P-R INTERVAL: 170 MS
EKG Q-T INTERVAL: 438 MS
EKG Q-T INTERVAL: 480 MS
EKG QRS DURATION: 84 MS
EKG QRS DURATION: 90 MS
EKG QTC CALCULATION (BAZETT): 428 MS
EKG QTC CALCULATION (BAZETT): 433 MS
EKG R AXIS: 18 DEGREES
EKG R AXIS: 26 DEGREES
EKG T AXIS: 48 DEGREES
EKG T AXIS: 52 DEGREES
EKG VENTRICULAR RATE: 48 BPM
EKG VENTRICULAR RATE: 59 BPM
EOSINOPHIL # BLD: 0.1 K/UL (ref 0–0.4)
EOSINOPHILS RELATIVE PERCENT: 2 % (ref 1–4)
ERYTHROCYTE [DISTWIDTH] IN BLOOD BY AUTOMATED COUNT: 15.4 % (ref 12.5–15.4)
GFR, ESTIMATED: >90 ML/MIN/1.73M2
GLUCOSE SERPL-MCNC: 82 MG/DL (ref 70–99)
HCT VFR BLD AUTO: 40.3 % (ref 41–53)
HGB BLD-MCNC: 13.1 G/DL (ref 13.5–17.5)
LYMPHOCYTES NFR BLD: 1.6 K/UL (ref 1–4.8)
LYMPHOCYTES RELATIVE PERCENT: 28 % (ref 24–44)
MCH RBC QN AUTO: 27.8 PG (ref 26–34)
MCHC RBC AUTO-ENTMCNC: 32.5 G/DL (ref 31–37)
MCV RBC AUTO: 85.6 FL (ref 80–100)
MONOCYTES NFR BLD: 0.5 K/UL (ref 0.1–1.2)
MONOCYTES NFR BLD: 8 % (ref 2–11)
NEUTROPHILS NFR BLD: 61 % (ref 36–66)
NEUTS SEG NFR BLD: 3.5 K/UL (ref 1.8–7.7)
PLATELET # BLD AUTO: 136 K/UL (ref 140–450)
PMV BLD AUTO: 7.4 FL (ref 6–12)
POTASSIUM SERPL-SCNC: 3.9 MMOL/L (ref 3.7–5.3)
PROCALCITONIN SERPL-MCNC: 0.03 NG/ML (ref 0–0.09)
RBC # BLD AUTO: 4.71 M/UL (ref 4.5–5.9)
SODIUM SERPL-SCNC: 146 MMOL/L (ref 135–144)
WBC OTHER # BLD: 5.7 K/UL (ref 3.5–11)

## 2024-05-15 PROCEDURE — 92610 EVALUATE SWALLOWING FUNCTION: CPT

## 2024-05-15 PROCEDURE — 99232 SBSQ HOSP IP/OBS MODERATE 35: CPT | Performed by: STUDENT IN AN ORGANIZED HEALTH CARE EDUCATION/TRAINING PROGRAM

## 2024-05-15 PROCEDURE — 6360000002 HC RX W HCPCS: Performed by: NURSE PRACTITIONER

## 2024-05-15 PROCEDURE — 99222 1ST HOSP IP/OBS MODERATE 55: CPT

## 2024-05-15 PROCEDURE — 2700000000 HC OXYGEN THERAPY PER DAY

## 2024-05-15 PROCEDURE — 6370000000 HC RX 637 (ALT 250 FOR IP): Performed by: NURSE PRACTITIONER

## 2024-05-15 PROCEDURE — 97530 THERAPEUTIC ACTIVITIES: CPT

## 2024-05-15 PROCEDURE — 36415 COLL VENOUS BLD VENIPUNCTURE: CPT

## 2024-05-15 PROCEDURE — 80048 BASIC METABOLIC PNL TOTAL CA: CPT

## 2024-05-15 PROCEDURE — 97110 THERAPEUTIC EXERCISES: CPT

## 2024-05-15 PROCEDURE — 2580000003 HC RX 258: Performed by: NURSE PRACTITIONER

## 2024-05-15 PROCEDURE — 71045 X-RAY EXAM CHEST 1 VIEW: CPT

## 2024-05-15 PROCEDURE — 1200000000 HC SEMI PRIVATE

## 2024-05-15 PROCEDURE — 85025 COMPLETE CBC W/AUTO DIFF WBC: CPT

## 2024-05-15 PROCEDURE — 97535 SELF CARE MNGMENT TRAINING: CPT

## 2024-05-15 PROCEDURE — 94760 N-INVAS EAR/PLS OXIMETRY 1: CPT

## 2024-05-15 PROCEDURE — 84145 PROCALCITONIN (PCT): CPT

## 2024-05-15 PROCEDURE — 94640 AIRWAY INHALATION TREATMENT: CPT

## 2024-05-15 RX ADMIN — IPRATROPIUM BROMIDE AND ALBUTEROL SULFATE 1 DOSE: 2.5; .5 SOLUTION RESPIRATORY (INHALATION) at 20:27

## 2024-05-15 RX ADMIN — ACETAMINOPHEN 650 MG: 325 TABLET ORAL at 23:07

## 2024-05-15 RX ADMIN — IPRATROPIUM BROMIDE AND ALBUTEROL SULFATE 1 DOSE: 2.5; .5 SOLUTION RESPIRATORY (INHALATION) at 08:03

## 2024-05-15 RX ADMIN — IPRATROPIUM BROMIDE AND ALBUTEROL SULFATE 1 DOSE: 2.5; .5 SOLUTION RESPIRATORY (INHALATION) at 14:49

## 2024-05-15 RX ADMIN — SODIUM CHLORIDE: 9 INJECTION, SOLUTION INTRAVENOUS at 18:58

## 2024-05-15 RX ADMIN — AZITHROMYCIN MONOHYDRATE 500 MG: 500 INJECTION, POWDER, LYOPHILIZED, FOR SOLUTION INTRAVENOUS at 22:58

## 2024-05-15 RX ADMIN — ENOXAPARIN SODIUM 40 MG: 100 INJECTION SUBCUTANEOUS at 09:10

## 2024-05-15 RX ADMIN — CEFTRIAXONE SODIUM 1000 MG: 1 INJECTION, POWDER, FOR SOLUTION INTRAMUSCULAR; INTRAVENOUS at 22:26

## 2024-05-15 ASSESSMENT — PAIN DESCRIPTION - ORIENTATION: ORIENTATION: ANTERIOR

## 2024-05-15 ASSESSMENT — PAIN SCALES - GENERAL
PAINLEVEL_OUTOF10: 3
PAINLEVEL_OUTOF10: 0

## 2024-05-15 ASSESSMENT — PAIN DESCRIPTION - LOCATION: LOCATION: HEAD

## 2024-05-15 ASSESSMENT — PAIN DESCRIPTION - DESCRIPTORS: DESCRIPTORS: ACHING

## 2024-05-15 NOTE — PLAN OF CARE
coping skills, available support systems and cultural and spiritual values  2. Provide emotional support, including active listening and acknowledgement of concerns of patient and caregivers  3. Reduce environmental stimuli, as able  4. Instruct patient/family in relaxation techniques, as appropriate  5. Assess for spiritual pain/suffering and initiate Spiritual Care, Psychosocial Clinical Specialist consults as needed  5/15/2024 0008 by Aide Gilmore, RN  Outcome: Progressing  Flowsheets (Taken 5/14/2024 2000)  Patient/family able to verbalize anxieties, fears, and concerns, and demonstrate effective coping: Assist patient/family to identify coping skills, available support systems and cultural and spiritual values     Problem: Decision Making  Goal: Pt/Family able to effectively weigh alternatives and participate in decision making related to treatment and care  Description: INTERVENTIONS:  1. Determine when there are differences between patient's view, family's view, and healthcare provider's view of condition  2. Facilitate patient and family articulation of goals for care  3. Help patient and family identify pros/cons of alternative solutions  4. Provide information as requested by patient/family  5. Respect patient/family right to receive or not to receive information  6. Serve as a liaison between patient and family and health care team  7. Initiate Consults from Ethics, Palliative Care or initiate Family Care Conference as is appropriate  5/15/2024 0008 by Adie Gilmore, RN  Outcome: Progressing  Flowsheets (Taken 5/14/2024 2000)  Patient/family able to effectively weigh alternatives and participate in decision making related to treatment and care: Determine when there are differences between patient's view, family's view, and healthcare provider's view of condition     Problem: Spiritual Care  Goal: Pt/Family able to move forward in process of forgiving self, others, and/or higher  power  Description: INTERVENTIONS:  1. Assist patient/family to overcome blocks to healing by use of spiritual practices (prayer, meditation, guided imagery, reiki, breath work, etc).  2. De-myth guilt and help patient/family identify possible irrational spiritual/cultural beliefs and values.  3. Explore possibilities of making amends & reconciliation with self, others, and/or a greater power.  4. Guide patient/family in identifying painful feelings of guilt.  5. Help patient/famiy explore and identify spiritual beliefs, cultural understandings or values that may help or hinder letting go of issue.  6. Help patient/family explore feelings of anger, bitterness, resentment.  7. Help patient/family identify and examine the situation in which these feelings are experienced.  8. Help patient/family identify destructive displacement of feelings onto other individuals.  9. Invite use of sacraments/rituals/ceremonies as appropriate (e.g. - confession, anointing, smudging).  10. Refer patient/family to formal counseling and/or to joy community for further support work.  5/15/2024 0008 by Aide Gilmore, RN  Outcome: Progressing  Flowsheets (Taken 5/14/2024 2000)  Patient/family able to move forward in process of forgiving self, others, and/or higher power: Assist patient to overcome blocks to healing by use of spiritual practices (prayer, meditation, guided imagery, reiki, breath work, etc)     Problem: Confusion  Goal: Confusion, delirium, dementia, or psychosis is improved or at baseline  Description: INTERVENTIONS:  1. Assess for possible contributors to thought disturbance, including medications, impaired vision or hearing, underlying metabolic abnormalities, dehydration, psychiatric diagnoses, and notify attending LIP  2. [place] high risk fall precautions, as indicated  3. Provide frequent short contacts to provide reality reorientation, refocusing and direction  4. Decrease environmental stimuli, including

## 2024-05-15 NOTE — PROGRESS NOTES
Occupational Therapy  Facility/Department: [address]  Occupational Therapy Daily Treatment Note     Name: Matthew Alex  : 1960  MRN: [id number]  Date of Service: 5/15/2024    Discharge Recommendations:  Patient would benefit from continued therapy after discharge  OT Equipment Recommendations  Other: AE/DME recommnedations TBD       Patient Diagnosis(es): The primary encounter diagnosis was Pneumonia of both lungs due to infectious organism, unspecified part of lung. Diagnoses of Hypoxemia and Somnolence were also pertinent to this visit.  Past Medical History:  has a past medical history of BPH with obstruction/lower urinary tract symptoms, Depression, Dysphagia, oropharyngeal phase, GERD without esophagitis, Hyperlipidemia, Iron deficiency anemia secondary to blood loss (chronic), Moderate intellectual disabilities, Nonsuicidal self-harm (HCC), Obstructive and reflux uropathy, unspecified, Other spondylosis with myelopathy, cervical region, Pervasive developmental disorder, Post-traumatic urethral stricture, male, meatal, Schizoaffective disorder, depressive type (HCC), Vascular dementia with psychotic disturbance (HCC), and Vitamin D deficiency.  Past Surgical History:  has no past surgical history on file.           Assessment   Performance deficits / Impairments: Decreased functional mobility ;Decreased fine motor control;Decreased strength;Decreased ROM;Decreased endurance;Decreased posture;Decreased cognition;Decreased ADL status;Decreased balance;Decreased coordination;Decreased safe awareness  Assessment: Pt seen for therapy tx s/p admission with pneumonia. Pt arrives from ECF; unsure of PLOF however pt likely ranges from MOD-DEP for all ADLs and assist x2 for all ADL transfers. Pt presents with decreased ADL status secondary to above noted deficits. At this time, pt is mod A with utensils after modification and appears to be safe to return to facility with therapy services. Pt to benefit from  at MIN A with minimal spillage (0-2) of food/drink  Short Term Goal 4: Pt to tolerate >15 mins of BUE HEP (AROM/AAROM/PROM/FMC/GMC) to support improved functional strength and endurance needed for ADLs and ADL transfers       Therapy Time   Individual Concurrent Group Co-treatment   Time In 1603         Time Out 1618         Minutes 15         Timed Code Treatment Minutes: 15 Minutes       TAWNYA MCDUFFIE OT

## 2024-05-15 NOTE — RT PROTOCOL NOTE
RT Inhaler-Nebulizer Bronchodilator Protocol Note    There is a bronchodilator order in the chart from a provider indicating to follow the RT Bronchodilator Protocol and there is an “Initiate RT Inhaler-Nebulizer Bronchodilator Protocol” order as well (see protocol at bottom of note).    CXR Findings:  XR CHEST PORTABLE    Result Date: 5/14/2024  Subtle infiltrate in the right upper lobe. Mild perihilar congestion, but no active pleural disease.     XR CHEST PORTABLE    Result Date: 5/13/2024  Pulmonary venous congestion with perihilar pulmonary consolidation. Findings could be due to pulmonary edema or multifocal pneumonia.       The findings from the last RT Protocol Assessment were as follows:   History Pulmonary Disease: None or smoker <15 pack years  Respiratory Pattern: Dyspnea on exertion or RR 21-25 bpm  Breath Sounds: Slightly diminished and/or crackles  Cough: Weak, non-productive  Indication for Bronchodilator Therapy:    Bronchodilator Assessment Score: 7    Aerosolized bronchodilator medication orders have been revised according to the RT Inhaler-Nebulizer Bronchodilator Protocol below.    Respiratory Therapist to perform RT Therapy Protocol Assessment initially then follow the protocol.  Repeat RT Therapy Protocol Assessment PRN for score 0-3 or on second treatment, BID, and PRN for scores above 3.    No Indications - adjust the frequency to every 6 hours PRN wheezing or bronchospasm, if no treatments needed after 48 hours then discontinue using Per Protocol order mode.     If indication present, adjust the RT bronchodilator orders based on the Bronchodilator Assessment Score as indicated below.  Use Inhaler orders unless patient has one or more of the following: on home nebulizer, not able to hold breath for 10 seconds, is not alert and oriented, cannot activate and use MDI correctly, or respiratory rate 25 breaths per minute or more, then use the equivalent nebulizer order(s) with same Frequency and

## 2024-05-15 NOTE — PLAN OF CARE
Problem: Respiratory - Adult  Goal: Achieves optimal ventilation and oxygenation  5/15/2024 0808 by Tessa Burgess RCP  Outcome: Progressing

## 2024-05-15 NOTE — CONSULTS
Palliative Care Inpatient Consult    NAME:  Matthew Alex  MEDICAL RECORD NUMBER:  [id number]  AGE: 63 y.o.   GENDER: male  : 1960  TODAY'S DATE:  5/15/2024    Reasons for Consultation:    Symptom and/or pain management  Provision of information regarding PC and/or hospice philosophies  Complex, time-intensive communication and interdisciplinary psychosocial support  Clarification of goals of care and/or assistance with difficult decision-making  Guidance in regards to resources and transition(s)    Members of PC team contributing to this consultation are : Isabell Restrepo, Palliative Care APRN-CNP    Plan      Palliative Interaction: Patient seen and examined on rounds. Patient resting comfortably in bed, watching television. Is alert, only oriented to self however it appears this is similar to baseline as patient has a known cognitive delay. He is currently without complaint, other than a persistent cough.     Call placed to patients mother Kaylyn. She recalls speaking with our palliative nurse Luciana yesterday. She states she was able to visit Danish last night and felt he looked well.     Yesterday, code status was discussed with Kaylyn, as she stated wishing she had changed this years ago. It was explained this could be changed at any time. Kaylyn states still thinking this over. She notes her main concern at this time is working to get Danish a guardian. She is quite concerned for his future as she is 87 years old herself. She states they have been working to get him a guardian for nearly 2.5 years.     Additionally, she expresses concerns with his current facility. She notes when he was admitted there he was diagnosed with dementia. Kaylyn states Danish does not have dementia but rather this is related to his developmental delay. Because of this diagnosis he has been placed in the memory care unit. She does not feel this is best for him. She states she has been attempting to get him moved however he is on waiting

## 2024-05-15 NOTE — PROGRESS NOTES
SPEECH LANGUAGE PATHOLOGY  Facility/Department: [address]   CLINICAL BEDSIDE SWALLOW EVALUATION    NAME: Matthew Alex  : 1960  MRN: [id number]    ADMISSION DATE: 2024  ADMITTING DIAGNOSIS: has Community acquired pneumonia, bilateral; Acute hypoxic respiratory failure (HCC); Developmental delay; H/O cervical spine surgery; Encounter for palliative care; Goals of care, counseling/discussion; and Pneumonia of both lungs due to infectious organism on their problem list.    Recent Chest Xray: 2024  IMPRESSION:  Cardiomegaly with new mild pulmonary edema.    Date of Eval: 5/15/2024  Evaluating Therapist: MARISEL Hall    Current Diet level:  Current Diet : NPO  Current Liquid Diet : NPO    Primary Complaint  Matthew Alex is a 63 y.o. Non- / non  male who presents with Bradycardia and Respiratory Distress   and is admitted to the hospital for the management of Community acquired pneumonia, bilateral.     63-year-old male with developmental delay, psychiatric disorder?,   cervical fusion surgery, chronic headaches, GERD came in from nursing facility with altered mental status and shortness of breath, apparently was requiring oxygen which is new for him, has been currently on 2 L nasal cannula.  Was given Narcan by en route to ER with minimal benefit.  Also per chart was nonverbal but has been answering all my questions currently  On my evaluation is not sure why he is here, denying any complaints  CT head unremarkable  Chest x-ray concerning for pneumonia versus pulmonary edema.  Patient was started on IV antibiotics and admitted with Intermed service  Patient has been kept n.p.o. pending speech evaluation    Pain:  Pain Assessment  Pain Assessment: None - Denies Pain    Reason for Referral  Matthew Alex was referred for a bedside swallow evaluation to assess the efficiency of his swallow function, identify signs and symptoms of aspiration and make recommendations regarding

## 2024-05-15 NOTE — CARE COORDINATION
Writer spoke with Madison @ [facility],[id number],  Patient to transport  5/16 by anand @ 115pm to [facility] for swallow study, confirmed with Alejandrina @ [facility] notified.

## 2024-05-15 NOTE — PROGRESS NOTES
[facility]    Shift date: 5/15/2024   Shift day: Wednesday   Shift # 1    Room # 307/307-01   Name: Matthew Alex                Oriental orthodox:    Place of Anabaptist:     Referral: Routine Visit    Admit Date & Time: 5/13/2024  8:03 PM    Assessment:  Matthew Alex is a 63 y.o. male in the hospital because he has pneumonia and is classified under palliative care. Upon entering the room writer observes that the patient was calm and verbally non-communicative. The patient has several health issues, one being community acquired bacterial infection. He also has appointment with a speech therapist, who has not arrived yet. His mother, spouse, and an adult child are in his support system. The nurse informed me of the treatment plans. The patient was delayed in his responses. He asked me food to eat, which I communicated to the nurse.       Intervention:  Writer introduced self and title as  Writer offered space for him  to express feelings, needs, and concerns and provided a ministry presence. Since he was delayed in responding to any inquiry, I had to consult his nurse for providing more effective spiritual care. I informed the nurse about his need for food. I wished the patient a speedy recovery.      Outcome:  He was unable to respond to anything due to his conditions. I wished him well since nothing else could have done.     Plan:  Chaplains will remain available to offer spiritual and emotional support as needed.      Electronically signed by Chaplain Gillian, on 5/15/2024 at 12:00 PM.  [facility]  [phone]

## 2024-05-15 NOTE — PLAN OF CARE
Problem: Respiratory - Adult  Goal: Achieves optimal ventilation and oxygenation  5/14/2024 2018 by Chantal Frank RCP  Outcome: Progressing  Flowsheets (Taken 5/14/2024 2018)  Achieves optimal ventilation and oxygenation:   Assess for changes in respiratory status   Respiratory therapy support as indicated   Assess for changes in mentation and behavior   Oxygen supplementation based on oxygen saturation or arterial blood gases   Assess and instruct to report shortness of breath or any respiratory difficulty   Encourage broncho-pulmonary hygiene including cough, deep breathe, incentive spirometry

## 2024-05-15 NOTE — PROGRESS NOTES
@[email or web address] [email or web address]@    [facility]   IN-PATIENT SERVICE   [facility]    Progress Note    5/15/2024    2:46 PM    Name:   Matthew Alex  MRN:     [id number]     Acct:      [id number]   Room:   [facility]307-01   Day:  2  Admit Date:  5/13/2024  8:03 PM    PCP:   Philip Wei MD  Code Status:  Full Code    Subjective:     C/C:   Chief Complaint   Patient presents with    Bradycardia    Respiratory Distress     Interval History Status: improved.     Seen at bedside, hemodynamically stable, on 1 L nasal cannula  Complaining of cough, otherwise no new complaints no acute events overnight  Labs reviewed unremarkable  Pending swallow eval, continues to be n.p.o.  Pending palliative evaluation    Brief History:     Matthew Alex is a 63 y.o. Non- / non  male who presents with Bradycardia and Respiratory Distress   and is admitted to the hospital for the management of Community acquired pneumonia, bilateral.     63-year-old male with developmental delay, psychiatric disorder?,   cervical fusion surgery, chronic headaches, GERD came in from nursing facility with altered mental status and shortness of breath, apparently was requiring oxygen which is new for him, has been currently on 2 L nasal cannula.  Was given Narcan by en route to ER with minimal benefit.  Also per chart was nonverbal but has been answering all my questions currently  On my evaluation is not sure why he is here, denying any complaints  CT head unremarkable  Chest x-ray concerning for pneumonia versus pulmonary edema.  Patient was started on IV antibiotics and admitted with Intermed service  Patient has been kept n.p.o. pending speech evaluation    Medications:     Allergies:  Not on File    Current Meds:   Scheduled Meds:    sodium chloride flush  5-40 mL IntraVENous 2 times per day    enoxaparin  40 mg SubCUTAneous Daily    cefTRIAXone (ROCEPHIN) IV  1,000 mg IntraVENous Q24H    And    azithromycin  500 mg  05/13/24  2235 05/14/24  0608 05/15/24  0622   *  --  146* 146*   K 4.2  --  3.8 3.9   *  --  114* 112*   CO2 27  --  25 25   GLUCOSE 97  --  89 82   BUN 17  --  16 13   CREATININE 1.1  --  0.9 0.9   ANIONGAP 10  --  7* 9   LABGLOM 75  --  >90 >90   CALCIUM 9.5  --  8.7 8.7   PROBNP  --  62  --   --    TROPHS 28* 23*  --   --      Recent Labs     05/13/24 2012   AST 21   ALT 21   ALKPHOS 101   BILITOT 0.3     ABG:  Lab Results   Component Value Date/Time    FIO2 30.0 05/13/2024 11:02 PM     Lab Results   Component Value Date/Time    SPECIAL 5Ml right hand 05/13/2024 08:40 PM     Lab Results   Component Value Date/Time    CULTURE NO GROWTH 1 DAY 05/13/2024 08:40 PM       Radiology:  XR CHEST PORTABLE    Result Date: 5/15/2024  Subtle infiltrate in the right upper lobe. Mild perihilar congestion, but no active pleural disease.     XR CHEST PORTABLE    Result Date: 5/15/2024  Cardiomegaly with new mild pulmonary edema.     CT HEAD WO CONTRAST    Result Date: 5/13/2024  No acute intracranial abnormality.     XR CHEST PORTABLE    Result Date: 5/13/2024  Pulmonary venous congestion with perihilar pulmonary consolidation. Findings could be due to pulmonary edema or multifocal pneumonia.       Physical Examination:       General appearance:  alert, cooperative and no distress  Mental Status: At baseline  Lungs:  clear to auscultation bilaterally, normal effort  Heart:  regular rate and rhythm, no murmur  Abdomen:  soft, nontender, nondistended, normal bowel sounds, no masses, hepatomegaly, splenomegaly  Extremities:  no edema, redness, tenderness in the calves  Skin:  no gross lesions, rashes, induration    Assessment:     Hospital Problems             Last Modified POA    * (Principal) Community acquired pneumonia, bilateral 5/13/2024 Yes    Acute hypoxic respiratory failure (HCC) 5/14/2024 Yes    Developmental delay 5/14/2024 Yes    H/O cervical spine surgery 5/14/2024 Yes    Encounter for palliative care

## 2024-05-15 NOTE — PLAN OF CARE
Problem: Discharge Planning  Goal: Discharge to home or other facility with appropriate resources  5/15/2024 1853 by Eli Nunez RN  Outcome: Progressing     Problem: Skin/Tissue Integrity  Goal: Absence of new skin breakdown  Description: 1.  Monitor for areas of redness and/or skin breakdown  2.  Assess vascular access sites hourly  3.  Every 4-6 hours minimum:  Change oxygen saturation probe site  4.  Every 4-6 hours:  If on nasal continuous positive airway pressure, respiratory therapy assess nares and determine need for appliance change or resting period.  5/15/2024 1853 by Eli Nunez RN  Outcome: Progressing     Problem: Safety - Adult  Goal: Free from fall injury  5/15/2024 1853 by Eli Nunez RN  Outcome: Progressing     Problem: Respiratory - Adult  Goal: Achieves optimal ventilation and oxygenation  5/15/2024 1853 by Eli Nunez RN  Outcome: Progressing     Problem: Neurosensory - Adult  Goal: Achieves stable or improved neurological status  5/15/2024 1853 by Eli Nunez RN  Outcome: Progressing     Problem: Skin/Tissue Integrity - Adult  Goal: Skin integrity remains intact  5/15/2024 1853 by Eli Nunez RN  Outcome: Progressing     Problem: Musculoskeletal - Adult  Goal: Return mobility to safest level of function  5/15/2024 1853 by Eli Nunez RN  Outcome: Progressing     Problem: Gastrointestinal - Adult  Goal: Minimal or absence of nausea and vomiting  5/15/2024 1853 by Eli Nunez RN  Outcome: Progressing

## 2024-05-15 NOTE — PROGRESS NOTES
Physical Therapy  Facility/Department: [address]  Physical Therapy Daily Documentation Note    Name: Matthew Alex  : 1960  MRN: [id number]  Date of Service: 5/15/2024    Discharge Recommendations:  Patient would benefit from continued therapy after discharge, Continue to assess pending progress   PT Equipment Recommendations  Equipment Needed: No      Patient Diagnosis(es): The primary encounter diagnosis was Pneumonia of both lungs due to infectious organism, unspecified part of lung. Diagnoses of Hypoxemia and Somnolence were also pertinent to this visit.  Past Medical History:  has a past medical history of BPH with obstruction/lower urinary tract symptoms, Depression, Dysphagia, oropharyngeal phase, GERD without esophagitis, Hyperlipidemia, Iron deficiency anemia secondary to blood loss (chronic), Moderate intellectual disabilities, Nonsuicidal self-harm (HCC), Obstructive and reflux uropathy, unspecified, Other spondylosis with myelopathy, cervical region, Pervasive developmental disorder, Post-traumatic urethral stricture, male, meatal, Schizoaffective disorder, depressive type (HCC), Vascular dementia with psychotic disturbance ([facility]), and Vitamin D deficiency.  Past Surgical History:  has no past surgical history on file.    Assessment   Body Structures, Functions, Activity Limitations Requiring Skilled Therapeutic Intervention: Decreased functional mobility ;Decreased strength;Decreased posture;Decreased balance;Decreased safe awareness;Decreased endurance  Assessment: Pt is resident of [facility] in [place] and charting indicates pt is non ambulatory and w/c bound for 1.5 years and dependent for mobility. At PT follow up, pt required max A out of hospital bed. He was able to transfer with Mckayla Stedy and mod A sit to[facility]. He stood 1min in Mckayla Stedy with min A facilitation. Prior visit trial gait with RW to HOB 2ft with RW mod Ax2 with max cues verbal/tactile as  with RW and mod A.  Short Term Goal 4: Pt will move bed to/from recliner stand pivot transfer with min A.       Education  Patient Education  Education Given To: Patient  Education Provided: Role of Therapy;Fall Prevention Strategies;Plan of Care;Equipment;Transfer Training  Education Provided Comments: Mckayla Hampton; pad call alert for RN; option for facility reassess need for PT  Education Method: Demonstration;Verbal;Teach Back  Barriers to Learning: Cognition  Education Outcome: Continued education needed;Demonstrated understanding      Therapy Time   Individual Concurrent Group Co-treatment   Time In 1416         Time Out 1446         Minutes 30         Timed Code Treatment Minutes: 30 Minutes       Tatiana Shanks PT

## 2024-05-16 ENCOUNTER — APPOINTMENT (OUTPATIENT)
Dept: GENERAL RADIOLOGY | Age: 64
End: 2024-05-16
Attending: STUDENT IN AN ORGANIZED HEALTH CARE EDUCATION/TRAINING PROGRAM
Payer: COMMERCIAL

## 2024-05-16 VITALS
RESPIRATION RATE: 18 BRPM | TEMPERATURE: 98.1 F | OXYGEN SATURATION: 90 % | BODY MASS INDEX: 31.24 KG/M2 | HEIGHT: 62 IN | SYSTOLIC BLOOD PRESSURE: 106 MMHG | WEIGHT: 169.75 LBS | HEART RATE: 56 BPM | DIASTOLIC BLOOD PRESSURE: 72 MMHG

## 2024-05-16 LAB
ANION GAP SERPL CALCULATED.3IONS-SCNC: 6 MMOL/L (ref 9–17)
BASOPHILS # BLD: 0 K/UL (ref 0–0.2)
BASOPHILS NFR BLD: 1 % (ref 0–2)
BUN SERPL-MCNC: 11 MG/DL (ref 8–23)
CALCIUM SERPL-MCNC: 8.7 MG/DL (ref 8.6–10.4)
CHLORIDE SERPL-SCNC: 112 MMOL/L (ref 98–107)
CO2 SERPL-SCNC: 27 MMOL/L (ref 20–31)
CREAT SERPL-MCNC: 0.8 MG/DL (ref 0.7–1.2)
EOSINOPHIL # BLD: 0.1 K/UL (ref 0–0.4)
EOSINOPHILS RELATIVE PERCENT: 3 % (ref 1–4)
ERYTHROCYTE [DISTWIDTH] IN BLOOD BY AUTOMATED COUNT: 15.3 % (ref 12.5–15.4)
GFR, ESTIMATED: >90 ML/MIN/1.73M2
GLUCOSE SERPL-MCNC: 91 MG/DL (ref 70–99)
HCT VFR BLD AUTO: 39.1 % (ref 41–53)
HGB BLD-MCNC: 12.8 G/DL (ref 13.5–17.5)
LYMPHOCYTES NFR BLD: 1.9 K/UL (ref 1–4.8)
LYMPHOCYTES RELATIVE PERCENT: 37 % (ref 24–44)
MCH RBC QN AUTO: 27.9 PG (ref 26–34)
MCHC RBC AUTO-ENTMCNC: 32.6 G/DL (ref 31–37)
MCV RBC AUTO: 85.4 FL (ref 80–100)
MONOCYTES NFR BLD: 0.4 K/UL (ref 0.1–1.2)
MONOCYTES NFR BLD: 8 % (ref 2–11)
NEUTROPHILS NFR BLD: 51 % (ref 36–66)
NEUTS SEG NFR BLD: 2.5 K/UL (ref 1.8–7.7)
PLATELET # BLD AUTO: 131 K/UL (ref 140–450)
PMV BLD AUTO: 7.4 FL (ref 6–12)
POTASSIUM SERPL-SCNC: 3.8 MMOL/L (ref 3.7–5.3)
RBC # BLD AUTO: 4.58 M/UL (ref 4.5–5.9)
SODIUM SERPL-SCNC: 145 MMOL/L (ref 135–144)
WBC OTHER # BLD: 5 K/UL (ref 3.5–11)

## 2024-05-16 PROCEDURE — 6370000000 HC RX 637 (ALT 250 FOR IP): Performed by: NURSE PRACTITIONER

## 2024-05-16 PROCEDURE — 99232 SBSQ HOSP IP/OBS MODERATE 35: CPT | Performed by: STUDENT IN AN ORGANIZED HEALTH CARE EDUCATION/TRAINING PROGRAM

## 2024-05-16 PROCEDURE — 36415 COLL VENOUS BLD VENIPUNCTURE: CPT

## 2024-05-16 PROCEDURE — 94640 AIRWAY INHALATION TREATMENT: CPT

## 2024-05-16 PROCEDURE — 74230 X-RAY XM SWLNG FUNCJ C+: CPT

## 2024-05-16 PROCEDURE — 2700000000 HC OXYGEN THERAPY PER DAY

## 2024-05-16 PROCEDURE — 92611 MOTION FLUOROSCOPY/SWALLOW: CPT

## 2024-05-16 PROCEDURE — 2580000003 HC RX 258: Performed by: NURSE PRACTITIONER

## 2024-05-16 PROCEDURE — 6360000002 HC RX W HCPCS: Performed by: NURSE PRACTITIONER

## 2024-05-16 PROCEDURE — 85025 COMPLETE CBC W/AUTO DIFF WBC: CPT

## 2024-05-16 PROCEDURE — 94761 N-INVAS EAR/PLS OXIMETRY MLT: CPT

## 2024-05-16 PROCEDURE — 80048 BASIC METABOLIC PNL TOTAL CA: CPT

## 2024-05-16 RX ORDER — LEVOFLOXACIN 750 MG/1
750 TABLET, FILM COATED ORAL DAILY
Qty: 5 TABLET | Refills: 0 | Status: SHIPPED | OUTPATIENT
Start: 2024-05-16 | End: 2024-05-21

## 2024-05-16 RX ADMIN — IPRATROPIUM BROMIDE AND ALBUTEROL SULFATE 1 DOSE: 2.5; .5 SOLUTION RESPIRATORY (INHALATION) at 14:14

## 2024-05-16 RX ADMIN — SODIUM CHLORIDE: 9 INJECTION, SOLUTION INTRAVENOUS at 00:00

## 2024-05-16 RX ADMIN — ENOXAPARIN SODIUM 40 MG: 100 INJECTION SUBCUTANEOUS at 08:39

## 2024-05-16 RX ADMIN — ACETAMINOPHEN 650 MG: 325 TABLET ORAL at 14:43

## 2024-05-16 RX ADMIN — IPRATROPIUM BROMIDE AND ALBUTEROL SULFATE 1 DOSE: 2.5; .5 SOLUTION RESPIRATORY (INHALATION) at 09:27

## 2024-05-16 RX ADMIN — SODIUM CHLORIDE, PRESERVATIVE FREE 10 ML: 5 INJECTION INTRAVENOUS at 08:40

## 2024-05-16 ASSESSMENT — PAIN DESCRIPTION - LOCATION: LOCATION: NECK

## 2024-05-16 ASSESSMENT — PAIN SCALES - GENERAL: PAINLEVEL_OUTOF10: 3

## 2024-05-16 NOTE — DISCHARGE INSTRUCTIONS
Complete antibiotics as prescribed, follow-up with PCP on discharge  Swallow study recommended easy to chew diet

## 2024-05-16 NOTE — PLAN OF CARE
Problem: Discharge Planning  Goal: Discharge to home or other facility with appropriate resources  5/16/2024 0124 by Aide Gilmore RN  Outcome: Progressing  Flowsheets (Taken 5/15/2024 2000)  Discharge to home or other facility with appropriate resources: Identify barriers to discharge with patient and caregiver     Problem: Skin/Tissue Integrity  Goal: Absence of new skin breakdown  Description: 1.  Monitor for areas of redness and/or skin breakdown  2.  Assess vascular access sites hourly  3.  Every 4-6 hours minimum:  Change oxygen saturation probe site  4.  Every 4-6 hours:  If on nasal continuous positive airway pressure, respiratory therapy assess nares and determine need for appliance change or resting period.  5/16/2024 0124 by Aide Gilmore RN  Outcome: Progressing     Problem: Safety - Adult  Goal: Free from fall injury  5/16/2024 0124 by Aide Gilmore RN  Outcome: Progressing     Problem: Respiratory - Adult  Goal: Achieves optimal ventilation and oxygenation  Recent Flowsheet Documentation  Taken 5/16/2024 0927 by Daniela Rose RCP  Achieves optimal ventilation and oxygenation:   Assess for changes in respiratory status   Oxygen supplementation based on oxygen saturation or arterial blood gases   Respiratory therapy support as indicated   Assess the need for suctioning and aspirate as needed  5/16/2024 0124 by Aide Gilmore RN  Outcome: Progressing     Problem: Neurosensory - Adult  Goal: Achieves stable or improved neurological status  5/16/2024 0124 by Aide Gilmore RN  Outcome: Progressing  Flowsheets (Taken 5/15/2024 2000)  Achieves stable or improved neurological status: Assess for and report changes in neurological status     Problem: Cardiovascular - Adult  Goal: Maintains optimal cardiac output and hemodynamic stability  5/16/2024 0124 by Aide Gilmore RN  Outcome: Progressing  Flowsheets (Taken 5/15/2024 2000)  Maintains optimal

## 2024-05-16 NOTE — PLAN OF CARE
etc)     Problem: Confusion  Goal: Confusion, delirium, dementia, or psychosis is improved or at baseline  Description: INTERVENTIONS:  1. Assess for possible contributors to thought disturbance, including medications, impaired vision or hearing, underlying metabolic abnormalities, dehydration, psychiatric diagnoses, and notify attending LIP  2. [place] high risk fall precautions, as indicated  3. Provide frequent short contacts to provide reality reorientation, refocusing and direction  4. Decrease environmental stimuli, including noise as appropriate  5. Monitor and intervene to maintain adequate nutrition, hydration, elimination, sleep and activity  6. If unable to ensure safety without constant attention obtain sitter and review sitter guidelines with assigned personnel  7. Initiate Psychosocial CNS and Spiritual Care consult, as indicated  5/16/2024 0124 by Aide Gilmore, RN  Outcome: Progressing  Flowsheets (Taken 5/15/2024 2000)  Effect of thought disturbance (confusion, delirium, dementia, or psychosis) are managed with adequate functional status: Assess for contributors to thought disturbance, including medications, impaired vision or hearing, underlying metabolic abnormalities, dehydration, psychiatric diagnoses, notify LIP

## 2024-05-16 NOTE — PROGRESS NOTES
mg-albuterol 2.5 mg  1 Dose Inhalation TID RT     Continuous Infusions:    sodium chloride       PRN Meds: sodium chloride flush, sodium chloride, ondansetron **OR** ondansetron, magnesium hydroxide, acetaminophen **OR** acetaminophen, albuterol    Data:     Past Medical History:   has a past medical history of BPH with obstruction/lower urinary tract symptoms, Depression, Dysphagia, oropharyngeal phase, GERD without esophagitis, Hyperlipidemia, Iron deficiency anemia secondary to blood loss (chronic), Moderate intellectual disabilities, Nonsuicidal self-harm (HCC), Obstructive and reflux uropathy, unspecified, Other spondylosis with myelopathy, cervical region, Pervasive developmental disorder, Post-traumatic urethral stricture, male, meatal, Schizoaffective disorder, depressive type (HCC), Vascular dementia with psychotic disturbance (HCC), and Vitamin D deficiency.    Social History:  Alcohol use questions deferred to the physician. Drug use questions deferred to the physician.     Family History: History reviewed. No pertinent family history.    Vitals:  /72   Pulse 56   Temp 98.1 °F (36.7 °C) (Axillary)   Resp 18   Ht 1.575 m (5' 2\")   Wt 77 kg (169 lb 12.1 oz)   SpO2 97%   BMI 31.05 kg/m²   Temp (24hrs), Av.2 °F (36.8 °C), Min:98.1 °F (36.7 °C), Max:98.3 °F (36.8 °C)    No results for input(s): \"POCGLU\" in the last 72 hours.    I/O (24Hr):    Intake/Output Summary (Last 24 hours) at 2024 1138  Last data filed at 2024 0505  Gross per 24 hour   Intake --   Output 1350 ml   Net -1350 ml         Labs:  Hematology:  Recent Labs     24  0608 05/15/24  0622 24  0620   WBC 6.3 5.7 5.0   RBC 4.95 4.71 4.58   HGB 13.8 13.1* 12.8*   HCT 42.2 40.3* 39.1*   MCV 85.4 85.6 85.4   MCH 27.9 27.8 27.9   MCHC 32.6 32.5 32.6   RDW 15.2 15.4 15.3   * 136* 131*   MPV 7.4 7.4 7.4   INR 1.1  --   --        Chemistry:  Recent Labs     24  2235 24  0608  05/15/24  0622 05/16/24  0620   *  --  146* 146* 145*   K 4.2  --  3.8 3.9 3.8   *  --  114* 112* 112*   CO2 27  --  25 25 27   GLUCOSE 97  --  89 82 91   BUN 17  --  16 13 11   CREATININE 1.1  --  0.9 0.9 0.8   ANIONGAP 10  --  7* 9 6*   LABGLOM 75  --  >90 >90 >90   CALCIUM 9.5  --  8.7 8.7 8.7   PROBNP  --  62  --   --   --    TROPHS 28* 23*  --   --   --        Recent Labs     05/13/24 2012   AST 21   ALT 21   ALKPHOS 101   BILITOT 0.3       ABG:  Lab Results   Component Value Date/Time    FIO2 30.0 05/13/2024 11:02 PM     Lab Results   Component Value Date/Time    SPECIAL 5Ml right hand 05/13/2024 08:40 PM     Lab Results   Component Value Date/Time    CULTURE NO GROWTH 2 DAYS 05/13/2024 08:40 PM       Radiology:  XR CHEST PORTABLE    Result Date: 5/15/2024  Subtle infiltrate in the right upper lobe. Mild perihilar congestion, but no active pleural disease.     XR CHEST PORTABLE    Result Date: 5/15/2024  Cardiomegaly with new mild pulmonary edema.     CT HEAD WO CONTRAST    Result Date: 5/13/2024  No acute intracranial abnormality.     XR CHEST PORTABLE    Result Date: 5/13/2024  Pulmonary venous congestion with perihilar pulmonary consolidation. Findings could be due to pulmonary edema or multifocal pneumonia.       Physical Examination:       General appearance:  alert, cooperative and no distress  Mental Status: At baseline  Lungs:  clear to auscultation bilaterally, normal effort  Heart:  regular rate and rhythm, no murmur  Abdomen:  soft, nontender, nondistended, normal bowel sounds, no masses, hepatomegaly, splenomegaly  Extremities:  no edema, redness, tenderness in the calves  Skin:  no gross lesions, rashes, induration    Assessment:     Hospital Problems             Last Modified POA    * (Principal) Community acquired pneumonia, bilateral 5/13/2024 Yes    Acute hypoxic respiratory failure (HCC) 5/14/2024 Yes    Developmental delay 5/14/2024 Yes    H/O cervical spine surgery 5/14/2024

## 2024-05-16 NOTE — PROGRESS NOTES
Pt scheduled for transport at 1930 via [facility] to [facility], report called to facility and all questions answered. Called Myndi and updated as well.     Update from shayla george: transport rescheduled to [phone].

## 2024-05-16 NOTE — RT PROTOCOL NOTE
RT Nebulizer Bronchodilator Protocol Note    There is a bronchodilator order in the chart from a provider indicating to follow the RT Bronchodilator Protocol and there is an “Initiate RT Bronchodilator Protocol” order as well (see protocol at bottom of note).    CXR Findings:  XR CHEST PORTABLE    Result Date: 5/15/2024  Subtle infiltrate in the right upper lobe. Mild perihilar congestion, but no active pleural disease.     XR CHEST PORTABLE    Result Date: 5/15/2024  Cardiomegaly with new mild pulmonary edema.     XR CHEST PORTABLE    Result Date: 5/13/2024  Pulmonary venous congestion with perihilar pulmonary consolidation. Findings could be due to pulmonary edema or multifocal pneumonia.       The findings from the last RT Protocol Assessment were as follows:  Smoking: None or smoker <15 pack years  Respiratory Pattern: Dyspnea on exertion or RR 21-25 bpm  Breath Sounds: Slightly diminished and/or crackles  Cough: Weak, non-productive  Indication for Bronchodilator Therapy:    Bronchodilator Assessment Score: 7    Aerosolized bronchodilator medication orders have been revised according to the RT Nebulizer Bronchodilator Protocol below.    Respiratory Therapist to perform RT Therapy Protocol Assessment initially then follow the protocol.  Repeat RT Therapy Protocol Assessment PRN for score 0-3 or on second treatment, BID, and PRN for scores above 3.    No Indications - adjust the frequency to every 6 hours PRN wheezing or bronchospasm, if no treatments needed after 48 hours then discontinue using Per Protocol order mode.     If indication present, adjust the RT bronchodilator orders based on the Bronchodilator Assessment Score as indicated below.  If a patient is on this medication at home then do not decrease Frequency below that used at home.    0-3 - enter or revise RT bronchodilator order(s) to equivalent RT Bronchodilator order with Frequency of every 4 hours PRN for wheezing or increased work of breathing

## 2024-05-16 NOTE — PLAN OF CARE
PROVIDE ADEQUATE OXYGENATION WITH ACCEPTABLE SP02/ABG'S    [x]  IDENTIFY APPROPRIATE OXYGEN THERAPY  [x]   MONITOR SP02/ABG'S AS NEEDED   [x]   PATIENT EDUCATION AS NEEDED  BRONCHOSPASM/BRONCHOCONSTRICTION    IMPROVE  AERATION/BREATHSOUNDS  ADMINISTER BRONCHODILATOR THERAPY AS APPROPRIATE  ASSESS BREATH SOUNDS  PATIENT EDUCATION AS NEEDED      Problem: Respiratory - Adult  Goal: Achieves optimal ventilation and oxygenation  5/15/2024 2033 by Estelita Peters RCP  Outcome: Progressing  Flowsheets (Taken 5/15/2024 2000 by Aide Gilmore RN)  Achieves optimal ventilation and oxygenation: Assess for changes in respiratory status

## 2024-05-16 NOTE — PROCEDURES
INSTRUMENTAL SWALLOW REPORT  MODIFIED BARIUM SWALLOW    NAME: Matthew Alex   : 1960  MRN: [id number]       Date of Eval: 2024              Referring Diagnosis(es):      Past Medical History:  has a past medical history of BPH with obstruction/lower urinary tract symptoms, Depression, Dysphagia, oropharyngeal phase, GERD without esophagitis, Hyperlipidemia, Iron deficiency anemia secondary to blood loss (chronic), Moderate intellectual disabilities, Nonsuicidal self-harm (HCC), Obstructive and reflux uropathy, unspecified, Other spondylosis with myelopathy, cervical region, Pervasive developmental disorder, Post-traumatic urethral stricture, male, meatal, Schizoaffective disorder, depressive type (HCC), Vascular dementia with psychotic disturbance (HCC), and Vitamin D deficiency.  Past Surgical History:  has no past surgical history on file.               Type of Study: Initial MBS       Recent CXR/CT of Chest: 24  IMPRESSION:  Cardiomegaly with new mild pulmonary edema.    Patient Complaints/Reason for Referral:  Matthew Alex was referred for a MBS to assess the efficiency of his/her swallow function, assess for aspiration, and to make recommendations regarding safe dietary consistencies, effective compensatory strategies, and safe eating environment.       Onset of problem:    Matthew Alex is a 63 y.o. Non- / non  male who presents with Bradycardia and Respiratory Distress   and is admitted to the hospital for the management of Community acquired pneumonia, bilateral.     63-year-old male with developmental delay, psychiatric disorder?,   cervical fusion surgery, chronic headaches, GERD came in from nursing facility with altered mental status and shortness of breath, apparently was requiring oxygen which is new for him, has been currently on 2 L nasal cannula.  Was given Narcan by en route to ER with minimal benefit.  Also per chart was nonverbal but has been

## 2024-05-16 NOTE — CARE COORDINATION
Writer spoke with Leno @ [facility], regarding discharge, awaiting call back.    1615 Rec'd call Leno @ [facility] patient can return   Maira Simpson notified.     1630 [facility] to transport by stretcher @ 6pm to [facility], Leno @ [facility] updated, Kaylyn, mother notified.      Rn Report [phone]    Packet    AVS/SIVA  24hr Mar  H &P      IMM letter provided to patient.  Patient offered four hours to make informed decision regarding appeal process; patient agreeable to discharge.

## 2024-05-16 NOTE — DISCHARGE INSTR - COC
Continuity of Care Form    Patient Name: Matthew Alex   :  1960  MRN:  [id number]    Admit date:  2024  Discharge date:  24    Code Status Order: Full Code   Advance Directives:     Admitting Physician:  Adriel Jin DO  PCP: Philip Wei MD    Discharging Nurse: Maira Olivo  Discharging Hospital Unit/Room#: 307/307-01  Discharging Unit Phone Number: [phone]    Emergency Contact:   Extended Emergency Contact Information  Primary Emergency Contact: Clayton Alex  Home Phone: [phone]  Mobile Phone: [phone]  Relation: Brother/Sister  Preferred language: English   needed? No  Secondary Emergency Contact: Kaylyn Sylvester  Home Phone: [phone]  Mobile Phone: [phone]  Relation: Parent  Preferred language: English   needed? No    Past Surgical History:  History reviewed. No pertinent surgical history.    Immunization History:   Immunization History   Administered Date(s) Administered    COVID-19, MODERNA BLUE border, Primary or Immunocompromised, (age 12y+), IM, 100 mcg/0.5mL 2021, 2021       Active Problems:  Patient Active Problem List   Diagnosis Code    Community acquired pneumonia, bilateral J18.9    Acute hypoxic respiratory failure (HCC) J96.01    Developmental delay R62.50    H/O cervical spine surgery Z98.890    Encounter for palliative care Z51.5    Goals of care, counseling/discussion Z71.89    Pneumonia of both lungs due to infectious organism J18.9       Isolation/Infection:   Isolation            No Isolation          Patient Infection Status       None to display                     Nurse Assessment:  Last Vital Signs: /72   Pulse 56   Temp 98.1 °F (36.7 °C) (Axillary)   Resp 18   Ht 1.575 m (5' 2\")   Wt 77 kg (169 lb 12.1 oz)   SpO2 90%   BMI 31.05 kg/m²     Last documented pain score (0-10 scale): Pain Level: 3  Last Weight:   Wt Readings from Last 1 Encounters:   24 77 kg (169 lb 12.1 oz)

## 2024-05-16 NOTE — PLAN OF CARE
coping skills, available support systems and cultural and spiritual values  2. Provide emotional support, including active listening and acknowledgement of concerns of patient and caregivers  3. Reduce environmental stimuli, as able  4. Instruct patient/family in relaxation techniques, as appropriate  5. Assess for spiritual pain/suffering and initiate Spiritual Care, Psychosocial Clinical Specialist consults as needed  5/16/2024 0124 by Aide Gilmore, RN  Outcome: Progressing  Flowsheets (Taken 5/15/2024 2000)  Patient/family able to verbalize anxieties, fears, and concerns, and demonstrate effective coping: Assist patient/family to identify coping skills, available support systems and cultural and spiritual values     Problem: Decision Making  Goal: Pt/Family able to effectively weigh alternatives and participate in decision making related to treatment and care  Description: INTERVENTIONS:  1. Determine when there are differences between patient's view, family's view, and healthcare provider's view of condition  2. Facilitate patient and family articulation of goals for care  3. Help patient and family identify pros/cons of alternative solutions  4. Provide information as requested by patient/family  5. Respect patient/family right to receive or not to receive information  6. Serve as a liaison between patient and family and health care team  7. Initiate Consults from Ethics, Palliative Care or initiate Family Care Conference as is appropriate  5/16/2024 0124 by Aide Gilmore, RN  Outcome: Progressing  Flowsheets (Taken 5/15/2024 2000)  Patient/family able to effectively weigh alternatives and participate in decision making related to treatment and care: Determine when there are differences between patient's view, family's view, and healthcare provider's view of condition     Problem: Spiritual Care  Goal: Pt/Family able to move forward in process of forgiving self, others, and/or higher  noise as appropriate  5. Monitor and intervene to maintain adequate nutrition, hydration, elimination, sleep and activity  6. If unable to ensure safety without constant attention obtain sitter and review sitter guidelines with assigned personnel  7. Initiate Psychosocial CNS and Spiritual Care consult, as indicated  5/16/2024 0124 by Aide Gilmore, RN  Outcome: Progressing  Flowsheets (Taken 5/15/2024 2000)  Effect of thought disturbance (confusion, delirium, dementia, or psychosis) are managed with adequate functional status: Assess for contributors to thought disturbance, including medications, impaired vision or hearing, underlying metabolic abnormalities, dehydration, psychiatric diagnoses, notify LIP

## 2024-05-16 NOTE — PROGRESS NOTES
Physical Therapy                                                                Physical Therapy Cancel Note      DATE: 2024    NAME: Matthew Alex  MRN: [id number]   : 1960      Patient not seen this date for Physical Therapy due to:    Other: Pt out of room for swallow study at [facility]. PLAN: continue to pursue PT treatment as able.       Electronically signed by Tatiana Shanks PT on 2024 at 2:18 PM

## 2024-05-17 NOTE — DISCHARGE SUMMARY
@[email or web address]@    [facility]   IN-PATIENT SERVICE   [facility]    Discharge Summary     Patient ID: Matthew Alex  :  1960   MRN: [id number]     ACCOUNT:  [id number]   Patient's PCP: Philip Wei MD  Admit Date: 2024   Discharge Date: 2024  Length of Stay: 3  Code Status:  Prior  Admitting Physician: Adriel Jin, DO  Discharge Physician: Ata Kevin Sra, MD     Active Discharge Diagnoses:     Hospital Problem Lists:  Principal Problem:    Community acquired pneumonia, bilateral  Active Problems:    Acute hypoxic respiratory failure (HCC)    Developmental delay    H/O cervical spine surgery    Encounter for palliative care    Goals of care, counseling/discussion    Pneumonia of both lungs due to infectious organism  Resolved Problems:    * No resolved hospital problems. *      Admission Condition:  stable     Discharged Condition: stable    Hospital Stay:     Hospital Course: 63-year-old male with history of developmental delay, cervical fusion surgery, chronic headache came in from nursing facility with altered mental status and shortness of breath, apparently was requiring 2 L nasal cannula to maintain saturation, was given Narcan by EMS en route to the ER with minimal benefit, apparently was nonverbal which is unusual for him, chest x-ray done in the ER concerning for pneumonia patient was started on IV antibiotics and admitted with Intermed service, had barium swallow eval done, recommended easy to chew diet with thin liquids, recommended small sips and bites, only feed when alert and awake and upright at 90 degrees for all oral intake.  Patient was also seen by palliative care,  Currently stable for discharge          Significant therapeutic interventions: As described in hospital course    Significant Diagnostic Studies:   Labs / Micro:  CBC:   Lab Results   Component Value Date/Time    WBC 5.0 2024 06:20 AM    RBC 4.58 2024 06:20 AM  SWALLOW W VIDEO    Result Date: 5/16/2024  1. Trace flash penetration without aspiration with the thick liquid substance by teaspoon. 2. No penetration or aspiration with the remainder of the administered substances. Please see separate speech pathology report for full discussion of findings and recommendations.     XR CHEST PORTABLE    Result Date: 5/15/2024  Subtle infiltrate in the right upper lobe. Mild perihilar congestion, but no active pleural disease.     XR CHEST PORTABLE    Result Date: 5/15/2024  Cardiomegaly with new mild pulmonary edema.     CT HEAD WO CONTRAST    Result Date: 5/13/2024  No acute intracranial abnormality.     XR CHEST PORTABLE    Result Date: 5/13/2024  Pulmonary venous congestion with perihilar pulmonary consolidation. Findings could be due to pulmonary edema or multifocal pneumonia.       Consultations:    Consults:     Final Specialist Recommendations/Findings:   IP CONSULT TO PALLIATIVE CARE      The patient was seen and examined on day of discharge and this discharge summary is in conjunction with any daily progress note from day of discharge.    Discharge plan:     Disposition: SNF    Physician Follow Up:     Philip Wei MD  [address] [facility] [address] [phone]    Schedule an appointment as soon as possible for a visit in 1 week(s)      [place] [address]  [phone]           Requiring Further Evaluation/Follow Up POST HOSPITALIZATION/Incidental Findings: As described in radiology section    Diet:  Easy to chew diet with thin liquids    Activity: As tolerated    Instructions to Patient: Complete antibiotics as prescribed, follow-up with PCP on discharge  Swallow study recommended easy to chew diet    Discharge Medications:      Medication List        START taking these medications      levoFLOXacin 750 MG tablet  Commonly known as: LEVAQUIN  Take 1 tablet by mouth daily for 5 days            CONTINUE

## 2024-05-18 LAB
MICROORGANISM SPEC CULT: NORMAL
MICROORGANISM SPEC CULT: NORMAL
SERVICE CMNT-IMP: NORMAL
SERVICE CMNT-IMP: NORMAL
SPECIMEN DESCRIPTION: NORMAL
SPECIMEN DESCRIPTION: NORMAL

## 2024-06-21 ENCOUNTER — HOSPITAL ENCOUNTER (OUTPATIENT)
Age: 64
Setting detail: SPECIMEN
Discharge: HOME | End: 2024-06-21

## 2024-06-21 LAB
ANION GAP SERPL CALCULATED.3IONS-SCNC: 10 MMOL/L (ref 9–17)
BUN SERPL-MCNC: 17 MG/DL (ref 8–23)
BUN/CREAT SERPL: 17 (ref 9–20)
CALCIUM SERPL-MCNC: 9.1 MG/DL (ref 8.6–10.4)
CHLORIDE SERPL-SCNC: 106 MMOL/L (ref 98–107)
CHOLEST SERPL-MCNC: 116 MG/DL (ref 0–199)
CHOLESTEROL/HDL RATIO: 4
CO2 SERPL-SCNC: 26 MMOL/L (ref 20–31)
CREAT SERPL-MCNC: 1 MG/DL (ref 0.7–1.2)
ERYTHROCYTE [DISTWIDTH] IN BLOOD BY AUTOMATED COUNT: 14.4 % (ref 11.8–14.4)
GFR, ESTIMATED: 85 ML/MIN/1.73M2
GLUCOSE SERPL-MCNC: 101 MG/DL (ref 70–99)
HCT VFR BLD AUTO: 47.3 % (ref 40.7–50.3)
HDLC SERPL-MCNC: 27 MG/DL
HGB BLD-MCNC: 14.4 G/DL (ref 13–17)
LDLC SERPL CALC-MCNC: 57 MG/DL (ref 0–100)
MCH RBC QN AUTO: 28 PG (ref 25.2–33.5)
MCHC RBC AUTO-ENTMCNC: 30.4 G/DL (ref 28.4–34.8)
MCV RBC AUTO: 92 FL (ref 82.6–102.9)
NRBC BLD-RTO: 0 PER 100 WBC
PLATELET # BLD AUTO: 135 K/UL (ref 138–453)
PMV BLD AUTO: 9.9 FL (ref 8.1–13.5)
POTASSIUM SERPL-SCNC: 3.7 MMOL/L (ref 3.7–5.3)
RBC # BLD AUTO: 5.14 M/UL (ref 4.21–5.77)
SODIUM SERPL-SCNC: 142 MMOL/L (ref 135–144)
TRIGL SERPL-MCNC: 158 MG/DL (ref 0–149)
TSH SERPL DL<=0.05 MIU/L-ACNC: 2.07 UIU/ML (ref 0.3–5)
VLDLC SERPL CALC-MCNC: 32 MG/DL
WBC OTHER # BLD: 8 K/UL (ref 3.5–11.3)

## 2024-06-21 PROCEDURE — 36415 COLL VENOUS BLD VENIPUNCTURE: CPT

## 2024-06-21 PROCEDURE — 80048 BASIC METABOLIC PNL TOTAL CA: CPT

## 2024-06-21 PROCEDURE — 84443 ASSAY THYROID STIM HORMONE: CPT

## 2024-06-21 PROCEDURE — 85027 COMPLETE CBC AUTOMATED: CPT

## 2024-06-21 PROCEDURE — P9603 ONE-WAY ALLOW PRORATED MILES: HCPCS

## 2024-06-21 PROCEDURE — 80061 LIPID PANEL: CPT

## 2024-08-23 ENCOUNTER — HOSPITAL ENCOUNTER (OUTPATIENT)
Age: 64
Setting detail: SPECIMEN
Discharge: HOME | End: 2024-08-23

## 2024-08-23 LAB
25(OH)D3 SERPL-MCNC: 60 NG/ML (ref 30–100)
ALBUMIN SERPL-MCNC: 3.6 G/DL (ref 3.5–5.2)
ALP SERPL-CCNC: 90 U/L (ref 40–129)
ALT SERPL-CCNC: 27 U/L (ref 5–41)
AST SERPL-CCNC: 19 U/L
BILIRUB DIRECT SERPL-MCNC: 0.1 MG/DL
BILIRUB INDIRECT SERPL-MCNC: 0.1 MG/DL (ref 0–1)
BILIRUB SERPL-MCNC: 0.2 MG/DL (ref 0.3–1.2)
PROT SERPL-MCNC: 6.6 G/DL (ref 6.4–8.3)

## 2024-08-23 PROCEDURE — P9603 ONE-WAY ALLOW PRORATED MILES: HCPCS

## 2024-08-23 PROCEDURE — 82306 VITAMIN D 25 HYDROXY: CPT

## 2024-08-23 PROCEDURE — 80076 HEPATIC FUNCTION PANEL: CPT

## 2024-08-23 PROCEDURE — 36415 COLL VENOUS BLD VENIPUNCTURE: CPT

## 2024-12-30 ENCOUNTER — HOSPITAL ENCOUNTER (OUTPATIENT)
Age: 64
Setting detail: SPECIMEN
Discharge: HOME | End: 2024-12-30
Payer: COMMERCIAL

## 2024-12-30 LAB
BACTERIA URNS QL MICRO: NORMAL
BILIRUB UR QL STRIP: NEGATIVE
CASTS #/AREA URNS LPF: NORMAL /LPF (ref 0–8)
CLARITY UR: ABNORMAL
COLOR UR: YELLOW
EPI CELLS #/AREA URNS HPF: NORMAL /HPF (ref 0–5)
GLUCOSE UR STRIP-MCNC: NEGATIVE MG/DL
HGB UR QL STRIP.AUTO: ABNORMAL
KETONES UR STRIP-MCNC: NEGATIVE MG/DL
LEUKOCYTE ESTERASE UR QL STRIP: ABNORMAL
NITRITE UR QL STRIP: NEGATIVE
PH UR STRIP: 7 [PH] (ref 5–8)
PROT UR STRIP-MCNC: ABNORMAL MG/DL
RBC #/AREA URNS HPF: NORMAL /HPF (ref 0–4)
SP GR UR STRIP: 1 (ref 1–1.03)
UROBILINOGEN UR STRIP-ACNC: NORMAL EU/DL (ref 0–1)
WBC #/AREA URNS HPF: NORMAL /HPF (ref 0–5)

## 2024-12-30 PROCEDURE — 81001 URINALYSIS AUTO W/SCOPE: CPT

## 2024-12-30 PROCEDURE — 87086 URINE CULTURE/COLONY COUNT: CPT

## 2024-12-30 PROCEDURE — 86403 PARTICLE AGGLUT ANTBDY SCRN: CPT

## 2024-12-31 ENCOUNTER — HOSPITAL ENCOUNTER (OUTPATIENT)
Age: 64
Setting detail: SPECIMEN
Discharge: HOME | End: 2024-12-31
Payer: COMMERCIAL

## 2024-12-31 LAB
ALBUMIN SERPL-MCNC: 3.5 G/DL (ref 3.5–5.2)
ALBUMIN/GLOB SERPL: 1.2 {RATIO} (ref 1–2.5)
ALP SERPL-CCNC: 88 U/L (ref 40–129)
ALT SERPL-CCNC: 32 U/L (ref 10–50)
ANION GAP SERPL CALCULATED.3IONS-SCNC: 11 MMOL/L (ref 9–16)
AST SERPL-CCNC: 27 U/L (ref 10–50)
BILIRUB SERPL-MCNC: 0.3 MG/DL (ref 0–1.2)
BUN SERPL-MCNC: 10 MG/DL (ref 8–23)
CALCIUM SERPL-MCNC: 8.9 MG/DL (ref 8.8–10.2)
CHLORIDE SERPL-SCNC: 108 MMOL/L (ref 98–107)
CO2 SERPL-SCNC: 25 MMOL/L (ref 20–31)
CREAT SERPL-MCNC: 1 MG/DL (ref 0.7–1.2)
ERYTHROCYTE [DISTWIDTH] IN BLOOD BY AUTOMATED COUNT: 13.8 % (ref 11.8–14.4)
GFR, ESTIMATED: 87 ML/MIN/1.73M2
GLUCOSE SERPL-MCNC: 103 MG/DL (ref 82–115)
HCT VFR BLD AUTO: 44.9 % (ref 40.7–50.3)
HGB BLD-MCNC: 14.7 G/DL (ref 13–17)
MCH RBC QN AUTO: 29.9 PG (ref 25.2–33.5)
MCHC RBC AUTO-ENTMCNC: 32.7 G/DL (ref 28.4–34.8)
MCV RBC AUTO: 91.3 FL (ref 82.6–102.9)
MICROORGANISM SPEC CULT: ABNORMAL
NRBC BLD-RTO: 0 PER 100 WBC
PLATELET # BLD AUTO: 154 K/UL (ref 138–453)
PMV BLD AUTO: 9.7 FL (ref 8.1–13.5)
POTASSIUM SERPL-SCNC: 3.8 MMOL/L (ref 3.7–5.3)
PROT SERPL-MCNC: 6.3 G/DL (ref 6.6–8.7)
RBC # BLD AUTO: 4.92 M/UL (ref 4.21–5.77)
SODIUM SERPL-SCNC: 144 MMOL/L (ref 136–145)
SPECIMEN DESCRIPTION: ABNORMAL
WBC OTHER # BLD: 6.3 K/UL (ref 3.5–11.3)

## 2024-12-31 PROCEDURE — 80053 COMPREHEN METABOLIC PANEL: CPT

## 2024-12-31 PROCEDURE — 85027 COMPLETE CBC AUTOMATED: CPT

## 2024-12-31 PROCEDURE — 36415 COLL VENOUS BLD VENIPUNCTURE: CPT

## 2024-12-31 PROCEDURE — P9603 ONE-WAY ALLOW PRORATED MILES: HCPCS

## 2025-03-15 ENCOUNTER — HOSPITAL ENCOUNTER (OUTPATIENT)
Age: 65
Setting detail: SPECIMEN
Discharge: HOME | End: 2025-03-15

## 2025-03-15 LAB
BACTERIA URNS QL MICRO: ABNORMAL
BILIRUB UR QL STRIP: NEGATIVE
CLARITY UR: CLEAR
COLOR UR: YELLOW
EPI CELLS #/AREA URNS HPF: ABNORMAL /HPF (ref 0–5)
GLUCOSE UR STRIP-MCNC: NEGATIVE MG/DL
HGB UR QL STRIP.AUTO: ABNORMAL
KETONES UR STRIP-MCNC: NEGATIVE MG/DL
LEUKOCYTE ESTERASE UR QL STRIP: ABNORMAL
NITRITE UR QL STRIP: NEGATIVE
PH UR STRIP: 6 [PH] (ref 5–8)
PROT UR STRIP-MCNC: NEGATIVE MG/DL
RBC #/AREA URNS HPF: ABNORMAL /HPF (ref 0–2)
SP GR UR STRIP: 1.02 (ref 1–1.03)
UROBILINOGEN UR STRIP-ACNC: NORMAL EU/DL (ref 0–1)
WBC #/AREA URNS HPF: ABNORMAL /HPF (ref 0–5)

## 2025-03-15 PROCEDURE — 87086 URINE CULTURE/COLONY COUNT: CPT

## 2025-03-15 PROCEDURE — 81001 URINALYSIS AUTO W/SCOPE: CPT

## 2025-03-16 LAB
MICROORGANISM SPEC CULT: NO GROWTH
SERVICE CMNT-IMP: NORMAL
SPECIMEN DESCRIPTION: NORMAL

## 2025-03-18 ENCOUNTER — HOSPITAL ENCOUNTER (OUTPATIENT)
Age: 65
Setting detail: SPECIMEN
Discharge: HOME | End: 2025-03-18

## 2025-03-18 LAB
ANION GAP SERPL CALCULATED.3IONS-SCNC: 13 MMOL/L (ref 9–16)
BUN SERPL-MCNC: 10 MG/DL (ref 8–23)
CALCIUM SERPL-MCNC: 9.4 MG/DL (ref 8.8–10.2)
CHLORIDE SERPL-SCNC: 109 MMOL/L (ref 98–107)
CO2 SERPL-SCNC: 24 MMOL/L (ref 20–31)
CREAT SERPL-MCNC: 1.1 MG/DL (ref 0.7–1.2)
GFR, ESTIMATED: 76 ML/MIN/1.73M2
GLUCOSE SERPL-MCNC: 137 MG/DL (ref 82–115)
POTASSIUM SERPL-SCNC: 3.8 MMOL/L (ref 3.7–5.3)
PSA SERPL-MCNC: 0.98 NG/ML (ref 0–4)
SODIUM SERPL-SCNC: 146 MMOL/L (ref 136–145)

## 2025-03-18 PROCEDURE — G0103 PSA SCREENING: HCPCS

## 2025-03-18 PROCEDURE — 80048 BASIC METABOLIC PNL TOTAL CA: CPT

## 2025-03-18 PROCEDURE — 36415 COLL VENOUS BLD VENIPUNCTURE: CPT

## 2025-04-10 ENCOUNTER — HOSPITAL ENCOUNTER (OUTPATIENT)
Age: 65
Setting detail: SPECIMEN
Discharge: HOME | End: 2025-04-10
Payer: COMMERCIAL

## 2025-04-10 LAB
25(OH)D3 SERPL-MCNC: 67.9 NG/ML (ref 30–100)
ANION GAP SERPL CALCULATED.3IONS-SCNC: 14 MMOL/L (ref 9–16)
BUN SERPL-MCNC: 8 MG/DL (ref 8–23)
CALCIUM SERPL-MCNC: 8.8 MG/DL (ref 8.8–10.2)
CHLORIDE SERPL-SCNC: 107 MMOL/L (ref 98–107)
CO2 SERPL-SCNC: 21 MMOL/L (ref 20–31)
CREAT SERPL-MCNC: 1.1 MG/DL (ref 0.7–1.2)
ERYTHROCYTE [DISTWIDTH] IN BLOOD BY AUTOMATED COUNT: 13.3 % (ref 11.8–14.4)
GFR, ESTIMATED: 77 ML/MIN/1.73M2
GLUCOSE SERPL-MCNC: 157 MG/DL (ref 82–115)
HCT VFR BLD AUTO: 46.8 % (ref 40.7–50.3)
HGB BLD-MCNC: 15.1 G/DL (ref 13–17)
MCH RBC QN AUTO: 29.5 PG (ref 25.2–33.5)
MCHC RBC AUTO-ENTMCNC: 32.3 G/DL (ref 28.4–34.8)
MCV RBC AUTO: 91.4 FL (ref 82.6–102.9)
NRBC BLD-RTO: 0 PER 100 WBC
PLATELET # BLD AUTO: 152 K/UL (ref 138–453)
PMV BLD AUTO: 10.2 FL (ref 8.1–13.5)
POTASSIUM SERPL-SCNC: 3.9 MMOL/L (ref 3.7–5.3)
RBC # BLD AUTO: 5.12 M/UL (ref 4.21–5.77)
SODIUM SERPL-SCNC: 142 MMOL/L (ref 136–145)
WBC OTHER # BLD: 7.4 K/UL (ref 3.5–11.3)

## 2025-04-10 PROCEDURE — 85027 COMPLETE CBC AUTOMATED: CPT

## 2025-04-10 PROCEDURE — 82306 VITAMIN D 25 HYDROXY: CPT

## 2025-04-10 PROCEDURE — 36415 COLL VENOUS BLD VENIPUNCTURE: CPT

## 2025-04-10 PROCEDURE — 80048 BASIC METABOLIC PNL TOTAL CA: CPT

## 2025-05-13 ENCOUNTER — HOSPITAL ENCOUNTER (OUTPATIENT)
Age: 65
Setting detail: SPECIMEN
Discharge: HOME | End: 2025-05-13
Payer: COMMERCIAL

## 2025-05-13 LAB
ALBUMIN SERPL-MCNC: 3.5 G/DL (ref 3.5–5.2)
ALBUMIN/GLOB SERPL: 1.1 {RATIO} (ref 1–2.5)
ALP SERPL-CCNC: 89 U/L (ref 40–129)
ALT SERPL-CCNC: 100 U/L (ref 10–50)
ANION GAP SERPL CALCULATED.3IONS-SCNC: 12 MMOL/L (ref 9–16)
AST SERPL-CCNC: 76 U/L (ref 10–50)
BILIRUB DIRECT SERPL-MCNC: 0.1 MG/DL (ref 0–0.2)
BILIRUB INDIRECT SERPL-MCNC: 0.1 MG/DL
BILIRUB SERPL-MCNC: 0.2 MG/DL (ref 0–1.2)
BNP SERPL-MCNC: 78 PG/ML (ref 0–125)
BUN SERPL-MCNC: 9 MG/DL (ref 8–23)
CALCIUM SERPL-MCNC: 9 MG/DL (ref 8.8–10.2)
CHLORIDE SERPL-SCNC: 106 MMOL/L (ref 98–107)
CO2 SERPL-SCNC: 26 MMOL/L (ref 20–31)
CREAT SERPL-MCNC: 0.9 MG/DL (ref 0.7–1.2)
GFR, ESTIMATED: >90 ML/MIN/1.73M2
GLUCOSE SERPL-MCNC: 183 MG/DL (ref 82–115)
POTASSIUM SERPL-SCNC: 4 MMOL/L (ref 3.7–5.3)
PROT SERPL-MCNC: 6.6 G/DL (ref 6.6–8.7)
SODIUM SERPL-SCNC: 144 MMOL/L (ref 136–145)

## 2025-05-13 PROCEDURE — 80048 BASIC METABOLIC PNL TOTAL CA: CPT

## 2025-05-13 PROCEDURE — 36415 COLL VENOUS BLD VENIPUNCTURE: CPT

## 2025-05-13 PROCEDURE — 83880 ASSAY OF NATRIURETIC PEPTIDE: CPT

## 2025-05-13 PROCEDURE — 80076 HEPATIC FUNCTION PANEL: CPT

## 2025-06-18 ENCOUNTER — HOSPITAL ENCOUNTER (OUTPATIENT)
Age: 65
Setting detail: SPECIMEN
Discharge: HOME | End: 2025-06-18

## 2025-06-18 LAB
ANION GAP SERPL CALCULATED.3IONS-SCNC: 13 MMOL/L (ref 9–16)
BASOPHILS # BLD: 0.04 K/UL (ref 0–0.2)
BASOPHILS NFR BLD: 1 % (ref 0–2)
BUN SERPL-MCNC: 11 MG/DL (ref 8–23)
CALCIUM SERPL-MCNC: 9.1 MG/DL (ref 8.8–10.2)
CHLORIDE SERPL-SCNC: 105 MMOL/L (ref 98–107)
CO2 SERPL-SCNC: 24 MMOL/L (ref 20–31)
CREAT SERPL-MCNC: 1.1 MG/DL (ref 0.7–1.2)
EOSINOPHIL # BLD: 0.24 K/UL (ref 0–0.44)
EOSINOPHILS RELATIVE PERCENT: 4 % (ref 1–4)
ERYTHROCYTE [DISTWIDTH] IN BLOOD BY AUTOMATED COUNT: 13.3 % (ref 11.8–14.4)
GFR, ESTIMATED: 76 ML/MIN/1.73M2
GLUCOSE SERPL-MCNC: 262 MG/DL (ref 82–115)
HCT VFR BLD AUTO: 47.5 % (ref 40.7–50.3)
HGB BLD-MCNC: 15.4 G/DL (ref 13–17)
IMM GRANULOCYTES # BLD AUTO: 0.02 K/UL (ref 0–0.3)
IMM GRANULOCYTES NFR BLD: 0 %
LYMPHOCYTES NFR BLD: 1.56 K/UL (ref 1.1–3.7)
LYMPHOCYTES RELATIVE PERCENT: 26 % (ref 24–43)
MCH RBC QN AUTO: 29.5 PG (ref 25.2–33.5)
MCHC RBC AUTO-ENTMCNC: 32.4 G/DL (ref 28.4–34.8)
MCV RBC AUTO: 91 FL (ref 82.6–102.9)
MONOCYTES NFR BLD: 0.35 K/UL (ref 0.1–1.2)
MONOCYTES NFR BLD: 6 % (ref 3–12)
NEUTROPHILS NFR BLD: 63 % (ref 36–65)
NEUTS SEG NFR BLD: 3.79 K/UL (ref 1.5–8.1)
NRBC BLD-RTO: 0 PER 100 WBC
PLATELET # BLD AUTO: 156 K/UL (ref 138–453)
PMV BLD AUTO: 10.2 FL (ref 8.1–13.5)
POTASSIUM SERPL-SCNC: 4 MMOL/L (ref 3.7–5.3)
RBC # BLD AUTO: 5.22 M/UL (ref 4.21–5.77)
SODIUM SERPL-SCNC: 142 MMOL/L (ref 136–145)
WBC OTHER # BLD: 6 K/UL (ref 3.5–11.3)

## 2025-06-18 PROCEDURE — 80048 BASIC METABOLIC PNL TOTAL CA: CPT

## 2025-06-18 PROCEDURE — 85025 COMPLETE CBC W/AUTO DIFF WBC: CPT

## 2025-06-18 PROCEDURE — 36415 COLL VENOUS BLD VENIPUNCTURE: CPT

## 2025-07-14 ENCOUNTER — HOSPITAL ENCOUNTER (OUTPATIENT)
Age: 65
Setting detail: SPECIMEN
Discharge: HOME | End: 2025-07-14
Payer: COMMERCIAL

## 2025-07-14 LAB
ALBUMIN SERPL-MCNC: 3.6 G/DL (ref 3.5–5.2)
ALBUMIN/GLOB SERPL: 1.1 {RATIO} (ref 1–2.5)
ALP SERPL-CCNC: 107 U/L (ref 40–129)
ALT SERPL-CCNC: 44 U/L (ref 10–50)
ANION GAP SERPL CALCULATED.3IONS-SCNC: 14 MMOL/L (ref 9–16)
AST SERPL-CCNC: 43 U/L (ref 10–50)
BASOPHILS # BLD: 0.04 K/UL (ref 0–0.2)
BASOPHILS NFR BLD: 1 % (ref 0–2)
BILIRUB SERPL-MCNC: 0.4 MG/DL (ref 0–1.2)
BUN SERPL-MCNC: 8 MG/DL (ref 8–23)
CALCIUM SERPL-MCNC: 9 MG/DL (ref 8.8–10.2)
CHLORIDE SERPL-SCNC: 103 MMOL/L (ref 98–107)
CO2 SERPL-SCNC: 24 MMOL/L (ref 20–31)
CREAT SERPL-MCNC: 1.1 MG/DL (ref 0.7–1.2)
EOSINOPHIL # BLD: 0.3 K/UL (ref 0–0.44)
EOSINOPHILS RELATIVE PERCENT: 5 % (ref 1–4)
ERYTHROCYTE [DISTWIDTH] IN BLOOD BY AUTOMATED COUNT: 13.2 % (ref 11.8–14.4)
GFR, ESTIMATED: 76 ML/MIN/1.73M2
GLUCOSE SERPL-MCNC: 241 MG/DL (ref 82–115)
HCT VFR BLD AUTO: 46 % (ref 40.7–50.3)
HGB BLD-MCNC: 14.7 G/DL (ref 13–17)
IMM GRANULOCYTES # BLD AUTO: 0.02 K/UL (ref 0–0.3)
IMM GRANULOCYTES NFR BLD: 0 %
LYMPHOCYTES NFR BLD: 1.82 K/UL (ref 1.1–3.7)
LYMPHOCYTES RELATIVE PERCENT: 30 % (ref 24–43)
MCH RBC QN AUTO: 29.2 PG (ref 25.2–33.5)
MCHC RBC AUTO-ENTMCNC: 32 G/DL (ref 28.4–34.8)
MCV RBC AUTO: 91.3 FL (ref 82.6–102.9)
MONOCYTES NFR BLD: 0.4 K/UL (ref 0.1–1.2)
MONOCYTES NFR BLD: 7 % (ref 3–12)
NEUTROPHILS NFR BLD: 58 % (ref 36–65)
NEUTS SEG NFR BLD: 3.55 K/UL (ref 1.5–8.1)
NRBC BLD-RTO: 0 PER 100 WBC
PLATELET # BLD AUTO: 139 K/UL (ref 138–453)
PMV BLD AUTO: 10.9 FL (ref 8.1–13.5)
POTASSIUM SERPL-SCNC: 3.5 MMOL/L (ref 3.7–5.3)
PROT SERPL-MCNC: 6.8 G/DL (ref 6.6–8.7)
RBC # BLD AUTO: 5.04 M/UL (ref 4.21–5.77)
SODIUM SERPL-SCNC: 142 MMOL/L (ref 136–145)
WBC OTHER # BLD: 6.1 K/UL (ref 3.5–11.3)

## 2025-07-14 PROCEDURE — 85025 COMPLETE CBC W/AUTO DIFF WBC: CPT

## 2025-07-14 PROCEDURE — 80053 COMPREHEN METABOLIC PANEL: CPT

## 2025-07-14 PROCEDURE — 36415 COLL VENOUS BLD VENIPUNCTURE: CPT

## 2025-07-16 ENCOUNTER — HOSPITAL ENCOUNTER (OUTPATIENT)
Age: 65
Setting detail: SPECIMEN
Discharge: HOME | End: 2025-07-16

## 2025-07-16 LAB — 25(OH)D3 SERPL-MCNC: 55.1 NG/ML (ref 30–100)

## 2025-07-16 PROCEDURE — 82306 VITAMIN D 25 HYDROXY: CPT

## 2025-07-16 PROCEDURE — 36415 COLL VENOUS BLD VENIPUNCTURE: CPT

## 2025-08-02 ENCOUNTER — HOSPITAL ENCOUNTER (OUTPATIENT)
Age: 65
Setting detail: SPECIMEN
Discharge: HOME | End: 2025-08-02

## 2025-08-02 LAB
BACTERIA URNS QL MICRO: ABNORMAL
BILIRUB UR QL STRIP: NEGATIVE
CLARITY UR: ABNORMAL
COLOR UR: YELLOW
EPI CELLS #/AREA URNS HPF: ABNORMAL /HPF (ref 0–5)
GLUCOSE UR STRIP-MCNC: ABNORMAL MG/DL
HGB UR QL STRIP.AUTO: ABNORMAL
KETONES UR STRIP-MCNC: ABNORMAL MG/DL
LEUKOCYTE ESTERASE UR QL STRIP: ABNORMAL
NITRITE UR QL STRIP: NEGATIVE
PH UR STRIP: 8.5 [PH] (ref 5–8)
PROT UR STRIP-MCNC: ABNORMAL MG/DL
RBC #/AREA URNS HPF: ABNORMAL /HPF (ref 0–2)
SP GR UR STRIP: 1.01 (ref 1–1.03)
UROBILINOGEN UR STRIP-ACNC: ABNORMAL EU/DL (ref 0–1)
WBC #/AREA URNS HPF: ABNORMAL /HPF (ref 0–5)

## 2025-08-02 PROCEDURE — 87086 URINE CULTURE/COLONY COUNT: CPT

## 2025-08-02 PROCEDURE — 81001 URINALYSIS AUTO W/SCOPE: CPT

## 2025-08-03 LAB
MICROORGANISM SPEC CULT: ABNORMAL
SERVICE CMNT-IMP: ABNORMAL
SPECIMEN DESCRIPTION: ABNORMAL

## 2025-08-04 ENCOUNTER — HOSPITAL ENCOUNTER (OUTPATIENT)
Age: 65
Setting detail: SPECIMEN
Discharge: HOME | End: 2025-08-04
Payer: COMMERCIAL

## 2025-08-04 LAB
ALBUMIN SERPL-MCNC: 3.2 G/DL (ref 3.5–5.2)
ALBUMIN/GLOB SERPL: 0.7 {RATIO} (ref 1–2.5)
ALP SERPL-CCNC: 127 U/L (ref 40–129)
ALT SERPL-CCNC: 25 U/L (ref 10–50)
ANION GAP SERPL CALCULATED.3IONS-SCNC: 15 MMOL/L (ref 9–16)
AST SERPL-CCNC: 43 U/L (ref 10–50)
BILIRUB SERPL-MCNC: 0.6 MG/DL (ref 0–1.2)
BUN SERPL-MCNC: 25 MG/DL (ref 8–23)
CALCIUM SERPL-MCNC: 9.2 MG/DL (ref 8.8–10.2)
CHLORIDE SERPL-SCNC: 104 MMOL/L (ref 98–107)
CO2 SERPL-SCNC: 23 MMOL/L (ref 20–31)
CREAT SERPL-MCNC: 1.3 MG/DL (ref 0.7–1.2)
ERYTHROCYTE [DISTWIDTH] IN BLOOD BY AUTOMATED COUNT: 13.3 % (ref 11.8–14.4)
EST. AVERAGE GLUCOSE BLD GHB EST-MCNC: 243 MG/DL
GFR, ESTIMATED: 64 ML/MIN/1.73M2
GLUCOSE SERPL-MCNC: 226 MG/DL (ref 82–115)
HBA1C MFR BLD: 10.1 % (ref 4–6)
HCT VFR BLD AUTO: 45.8 % (ref 40.7–50.3)
HGB BLD-MCNC: 14.7 G/DL (ref 13–17)
MCH RBC QN AUTO: 29.2 PG (ref 25.2–33.5)
MCHC RBC AUTO-ENTMCNC: 32.1 G/DL (ref 28.4–34.8)
MCV RBC AUTO: 91.1 FL (ref 82.6–102.9)
MICROORGANISM SPEC CULT: ABNORMAL
NRBC BLD-RTO: 0 PER 100 WBC
PLATELET # BLD AUTO: 171 K/UL (ref 138–453)
PMV BLD AUTO: 10.3 FL (ref 8.1–13.5)
POTASSIUM SERPL-SCNC: 3.4 MMOL/L (ref 3.7–5.3)
PROT SERPL-MCNC: 7.4 G/DL (ref 6.6–8.7)
RBC # BLD AUTO: 5.03 M/UL (ref 4.21–5.77)
SERVICE CMNT-IMP: ABNORMAL
SODIUM SERPL-SCNC: 142 MMOL/L (ref 136–145)
SPECIMEN DESCRIPTION: ABNORMAL
WBC OTHER # BLD: 13.1 K/UL (ref 3.5–11.3)

## 2025-08-04 PROCEDURE — 83036 HEMOGLOBIN GLYCOSYLATED A1C: CPT

## 2025-08-04 PROCEDURE — 80053 COMPREHEN METABOLIC PANEL: CPT

## 2025-08-04 PROCEDURE — 85027 COMPLETE CBC AUTOMATED: CPT

## 2025-08-04 PROCEDURE — 36415 COLL VENOUS BLD VENIPUNCTURE: CPT

## 2025-08-13 ENCOUNTER — HOSPITAL ENCOUNTER (OUTPATIENT)
Age: 65
Setting detail: SPECIMEN
Discharge: HOME | End: 2025-08-13
Payer: COMMERCIAL

## 2025-08-13 LAB
ALBUMIN SERPL-MCNC: 3.1 G/DL (ref 3.5–5.2)
ALBUMIN/GLOB SERPL: 0.8 {RATIO} (ref 1–2.5)
ALP SERPL-CCNC: 99 U/L (ref 40–129)
ALT SERPL-CCNC: 60 U/L (ref 10–50)
ANION GAP SERPL CALCULATED.3IONS-SCNC: 15 MMOL/L (ref 9–16)
AST SERPL-CCNC: 48 U/L (ref 10–50)
BILIRUB SERPL-MCNC: 0.4 MG/DL (ref 0–1.2)
BUN SERPL-MCNC: 22 MG/DL (ref 8–23)
CALCIUM SERPL-MCNC: 8.9 MG/DL (ref 8.8–10.2)
CHLORIDE SERPL-SCNC: 103 MMOL/L (ref 98–107)
CO2 SERPL-SCNC: 23 MMOL/L (ref 20–31)
CREAT SERPL-MCNC: 1.1 MG/DL (ref 0.7–1.2)
GFR, ESTIMATED: 77 ML/MIN/1.73M2
GLUCOSE SERPL-MCNC: 348 MG/DL (ref 82–115)
POTASSIUM SERPL-SCNC: 3.3 MMOL/L (ref 3.7–5.3)
PROT SERPL-MCNC: 7 G/DL (ref 6.6–8.7)
SODIUM SERPL-SCNC: 140 MMOL/L (ref 136–145)

## 2025-08-13 PROCEDURE — 36415 COLL VENOUS BLD VENIPUNCTURE: CPT

## 2025-08-13 PROCEDURE — 80053 COMPREHEN METABOLIC PANEL: CPT

## 2025-08-19 ENCOUNTER — HOSPITAL ENCOUNTER (OUTPATIENT)
Age: 65
Setting detail: SPECIMEN
Discharge: HOME | End: 2025-08-19